# Patient Record
Sex: FEMALE | Race: WHITE | NOT HISPANIC OR LATINO | Employment: FULL TIME | URBAN - METROPOLITAN AREA
[De-identification: names, ages, dates, MRNs, and addresses within clinical notes are randomized per-mention and may not be internally consistent; named-entity substitution may affect disease eponyms.]

---

## 2018-03-01 PROBLEM — E03.9 HYPOTHYROIDISM: Status: ACTIVE | Noted: 2018-03-01

## 2018-03-01 PROBLEM — E66.9 CLASS 2 OBESITY WITH BODY MASS INDEX (BMI) OF 38.0 TO 38.9 IN ADULT: Status: ACTIVE | Noted: 2018-03-01

## 2018-03-01 PROBLEM — M54.50 LUMBAR PAIN WITH RADIATION DOWN RIGHT LEG: Status: ACTIVE | Noted: 2018-03-01

## 2018-03-01 PROBLEM — M79.604 LUMBAR PAIN WITH RADIATION DOWN RIGHT LEG: Status: ACTIVE | Noted: 2018-03-01

## 2018-03-01 PROBLEM — I10 HYPERTENSION: Status: ACTIVE | Noted: 2018-03-01

## 2018-03-01 PROBLEM — E66.812 CLASS 2 OBESITY WITH BODY MASS INDEX (BMI) OF 38.0 TO 38.9 IN ADULT: Status: ACTIVE | Noted: 2018-03-01

## 2018-11-02 PROBLEM — Z12.11 COLON CANCER SCREENING: Status: ACTIVE | Noted: 2018-11-02

## 2018-11-02 PROBLEM — Z12.39 BREAST CANCER SCREENING: Status: ACTIVE | Noted: 2018-11-02

## 2018-11-02 PROBLEM — I10 HYPERTENSION, ESSENTIAL: Status: ACTIVE | Noted: 2018-11-02

## 2018-12-18 LAB
HUMAN PAPILLOMAVIRUS (HPV): NORMAL
PAP SMEAR: NORMAL

## 2019-05-09 PROBLEM — I10 HYPERTENSION: Status: RESOLVED | Noted: 2018-03-01 | Resolved: 2019-05-09

## 2019-05-09 PROBLEM — E66.9 CLASS 2 OBESITY WITH BODY MASS INDEX (BMI) OF 38.0 TO 38.9 IN ADULT: Status: RESOLVED | Noted: 2018-03-01 | Resolved: 2019-05-09

## 2019-05-09 PROBLEM — E66.812 CLASS 2 OBESITY WITH BODY MASS INDEX (BMI) OF 38.0 TO 38.9 IN ADULT: Status: RESOLVED | Noted: 2018-03-01 | Resolved: 2019-05-09

## 2020-11-06 ENCOUNTER — PATIENT OUTREACH (OUTPATIENT)
Dept: ADMINISTRATIVE | Facility: HOSPITAL | Age: 60
End: 2020-11-06

## 2020-11-06 DIAGNOSIS — Z11.59 ENCOUNTER FOR HEPATITIS C SCREENING TEST FOR LOW RISK PATIENT: ICD-10-CM

## 2020-11-06 DIAGNOSIS — Z12.12 SCREENING FOR COLORECTAL CANCER: Primary | ICD-10-CM

## 2020-11-06 DIAGNOSIS — Z12.11 SCREENING FOR COLORECTAL CANCER: Primary | ICD-10-CM

## 2020-11-06 NOTE — PROGRESS NOTES
2020 Care Everywhere updates requested and reviewed.  Immunizations reconciled. Media reports reviewed.  Duplicate HM overrides and  orders removed.  Overdue HM topic chart audit and/or requested.  Overdue lab testing linked to upcoming lab appointments if applies.    Lab jose, and GloNav reviewed   Pap Smear and Lab testing   YES      WOG orders placed. HEP C, FITKIT  Letter mailed.  YES  HM updated with external PAP, HPV report.       Health Maintenance Due   Topic Date Due    Hepatitis C Screening  1960    HIV Screening  10/20/1977    Colorectal Cancer Screening  10/20/2012

## 2020-11-06 NOTE — LETTER
November 6, 2020    Shira Gloria  [address] [facility] [address]             [facility]  [address]  [facility] [address]  Phone: [phone] [facility] is committed to your overall health.  To help you get the most out of each of your visits, we will review your information to make sure you are up to date on all of your recommended tests and/or procedures.      Dr. Lyle Warren III, MD has found that your chart shows you may be due for a:    COLORECTAL CANCER SCREENING       If you have had any of the above done at another facility, please bring the records or information with you so that your record at [facility] will be complete.  If you would like to schedule any of these, please contact the clinic at [phone].    If you are currently taking medication, please bring it with you to your appointment for review.    Also, if you have any type of Advanced Directives, please bring them with you to your office visit so we may scan them into your chart.    Thank You,    Your [facility],  MD Albertina Hartley III, LPN Clinical Care Coordinator  [facility]  [address] [place] [address]  Phone [phone]  Fax: [phone]

## 2020-11-07 ENCOUNTER — PATIENT OUTREACH (OUTPATIENT)
Dept: ADMINISTRATIVE | Facility: HOSPITAL | Age: 60
End: 2020-11-07

## 2020-11-08 NOTE — PROGRESS NOTES
Immunizations reviewed. Legacy reviewed. Care Everywhere reviewed.  [facility], [facility], and [facility] reviewed.   PAP from 2018 hyperlinked to HM per [facility].   HM modifier adjusted. HM updated. Gap closed.   Chart review completed.               JASON

## 2020-11-09 ENCOUNTER — TELEPHONE (OUTPATIENT)
Dept: FAMILY MEDICINE | Facility: CLINIC | Age: 60
End: 2020-11-09

## 2020-11-09 NOTE — TELEPHONE ENCOUNTER
----- Message from Carrington Matamoros sent at 11/9/2020  8:47 AM CST -----  Contact: pt at  [phone]  Type:  RX Refill Request  Who Called:  pt  Refill or New Rx:  refill  RX Name and Strength:  diltiaZEM (CARDIZEM CD) 240 MG 24 hr capsule,levothyroxine (SYNTHROID) 88 MCG tablet  How is the patient currently taking it? (ex. 1XDay):  1XDay  Is this a 30 day or 90 day RX:  90  Preferred Pharmacy with phone number:    [facility][id number] - [place] [address] AT [facility] [address] & [facility]  [address] [place] [address]  Phone: [phone] Fax: [phone]  Local or Mail Order:  Local  Ordering Provider:  Kelvin Lerma Call Back Number:  [phone]  Additional Information:  pt states she called in medication refills last Thursday and she has a week supply left before her appointment. Please call back and advise

## 2020-11-09 NOTE — TELEPHONE ENCOUNTER
----- Message from Carrington Matamoros sent at 11/9/2020  8:47 AM CST -----  Contact: pt at  [phone]  Type:  RX Refill Request  Who Called:  pt  Refill or New Rx:  refill  RX Name and Strength:  diltiaZEM (CARDIZEM CD) 240 MG 24 hr capsule,levothyroxine (SYNTHROID) 88 MCG tablet  How is the patient currently taking it? (ex. 1XDay):  1XDay  Is this a 30 day or 90 day RX:  90  Preferred Pharmacy with phone number:    [facility] #[id number] - [place] [address] AT [facility] [address] & [email or web address]  [address] [place] [address]  Phone: [phone] Fax: [phone]  Local or Mail Order:  Local  Ordering Provider:  Kelvin Lerma Call Back Number:  [phone]  Additional Information:  pt states she called in medication refills last Thursday and she has a week supply left before her appointment. Please call back and advise      CALLED IN BOTH RX 90/1 QD

## 2020-11-17 ENCOUNTER — TELEPHONE (OUTPATIENT)
Dept: FAMILY MEDICINE | Facility: CLINIC | Age: 60
End: 2020-11-17

## 2020-11-17 NOTE — TELEPHONE ENCOUNTER
----- Message from Hugo Osuna sent at 11/17/2020 10:13 AM CST -----  Contact: Self  Type: Needs Medical Advice  Who Called:  Patient  Symptoms (please be specific):  Poison ivy  How long has patient had these symptoms:  yesterday  Pharmacy name and phone #:    [facility] #[id number] - [address] AT [facility] [address] & [place] [place] [address]  Phone: [phone] Fax: [phone]      Best Call Back Number: [phone] (home)     Additional Information: Patient would like to be re-prescribed medication used to treat symptoms earlier this year.       Diprolene cream #60gr/2 apply bid rash

## 2020-11-20 ENCOUNTER — HOSPITAL ENCOUNTER (OUTPATIENT)
Dept: RADIOLOGY | Facility: HOSPITAL | Age: 60
Discharge: HOME | End: 2020-11-20
Attending: FAMILY MEDICINE
Payer: COMMERCIAL

## 2020-11-20 ENCOUNTER — OFFICE VISIT (OUTPATIENT)
Dept: FAMILY MEDICINE | Facility: CLINIC | Age: 60
End: 2020-11-20
Payer: COMMERCIAL

## 2020-11-20 VITALS
TEMPERATURE: 97 F | OXYGEN SATURATION: 95 % | SYSTOLIC BLOOD PRESSURE: 114 MMHG | DIASTOLIC BLOOD PRESSURE: 65 MMHG | HEART RATE: 66 BPM | BODY MASS INDEX: 39.65 KG/M2 | WEIGHT: 203 LBS

## 2020-11-20 DIAGNOSIS — H91.90 HEARING LOSS, UNSPECIFIED HEARING LOSS TYPE, UNSPECIFIED LATERALITY: ICD-10-CM

## 2020-11-20 DIAGNOSIS — M54.31 RIGHT SCIATIC NERVE PAIN: ICD-10-CM

## 2020-11-20 DIAGNOSIS — I10 HYPERTENSION, ESSENTIAL: Primary | ICD-10-CM

## 2020-11-20 DIAGNOSIS — E66.01 CLASS 2 SEVERE OBESITY DUE TO EXCESS CALORIES WITH SERIOUS COMORBIDITY AND BODY MASS INDEX (BMI) OF 39.0 TO 39.9 IN ADULT: ICD-10-CM

## 2020-11-20 DIAGNOSIS — E03.9 HYPOTHYROIDISM, UNSPECIFIED TYPE: ICD-10-CM

## 2020-11-20 DIAGNOSIS — M54.31 RIGHT SIDED SCIATICA: ICD-10-CM

## 2020-11-20 DIAGNOSIS — L25.9 CONTACT DERMATITIS, UNSPECIFIED CONTACT DERMATITIS TYPE, UNSPECIFIED TRIGGER: ICD-10-CM

## 2020-11-20 PROCEDURE — 3008F PR BODY MASS INDEX (BMI) DOCUMENTED: ICD-10-PCS | Mod: CPTII,S$GLB,, | Performed by: FAMILY MEDICINE

## 2020-11-20 PROCEDURE — 3008F BODY MASS INDEX DOCD: CPT | Mod: CPTII,S$GLB,, | Performed by: FAMILY MEDICINE

## 2020-11-20 PROCEDURE — 99214 PR OFFICE/OUTPT VISIT, EST, LEVL IV, 30-39 MIN: ICD-10-PCS | Mod: S$GLB,,, | Performed by: FAMILY MEDICINE

## 2020-11-20 PROCEDURE — 1125F PR PAIN SEVERITY QUANTIFIED, PAIN PRESENT: ICD-10-PCS | Mod: S$GLB,,, | Performed by: FAMILY MEDICINE

## 2020-11-20 PROCEDURE — 1125F AMNT PAIN NOTED PAIN PRSNT: CPT | Mod: S$GLB,,, | Performed by: FAMILY MEDICINE

## 2020-11-20 PROCEDURE — 3078F PR MOST RECENT DIASTOLIC BLOOD PRESSURE < 80 MM HG: ICD-10-PCS | Mod: CPTII,S$GLB,, | Performed by: FAMILY MEDICINE

## 2020-11-20 PROCEDURE — 3074F SYST BP LT 130 MM HG: CPT | Mod: CPTII,S$GLB,, | Performed by: FAMILY MEDICINE

## 2020-11-20 PROCEDURE — 72110 X-RAY EXAM L-2 SPINE 4/>VWS: CPT | Mod: TC

## 2020-11-20 PROCEDURE — 99214 OFFICE O/P EST MOD 30 MIN: CPT | Mod: S$GLB,,, | Performed by: FAMILY MEDICINE

## 2020-11-20 PROCEDURE — 3074F PR MOST RECENT SYSTOLIC BLOOD PRESSURE < 130 MM HG: ICD-10-PCS | Mod: CPTII,S$GLB,, | Performed by: FAMILY MEDICINE

## 2020-11-20 PROCEDURE — 3078F DIAST BP <80 MM HG: CPT | Mod: CPTII,S$GLB,, | Performed by: FAMILY MEDICINE

## 2020-11-20 RX ORDER — HYDROCODONE BITARTRATE AND ACETAMINOPHEN 5; 325 MG/1; MG/1
1 TABLET ORAL EVERY 6 HOURS PRN
Qty: 28 TABLET | Refills: 0 | Status: SHIPPED | OUTPATIENT
Start: 2020-11-20 | End: 2021-05-07

## 2020-11-20 RX ORDER — INFLUENZA A VIRUS A/VICTORIA/2454/2019 IVR-207 (H1N1) ANTIGEN (PROPIOLACTONE INACTIVATED), INFLUENZA A VIRUS A/HONG KONG/2671/2019 IVR-208 (H3N2) ANTIGEN (PROPIOLACTONE INACTIVATED), INFLUENZA B VIRUS B/VICTORIA/705/2018 BVR-11 ANTIGEN (PROPIOLACTONE INACTIVATED), INFLUENZA B VIRUS B/PHUKET/3073/2013 BVR-1B ANTIGEN (PROPIOLACTONE INACTIVATED) 15; 15; 15; 15 UG/.5ML; UG/.5ML; UG/.5ML; UG/.5ML
INJECTION, SUSPENSION INTRAMUSCULAR
COMMUNITY
Start: 2020-08-25 | End: 2021-05-07

## 2020-11-20 RX ORDER — HYDROCODONE BITARTRATE AND ACETAMINOPHEN 5; 325 MG/1; MG/1
1 TABLET ORAL EVERY 6 HOURS PRN
COMMUNITY
End: 2020-11-20 | Stop reason: SDUPTHER

## 2020-11-20 RX ORDER — PREDNISONE 20 MG/1
TABLET ORAL
Qty: 14 TABLET | Refills: 0 | Status: SHIPPED | OUTPATIENT
Start: 2020-11-20 | End: 2021-05-07

## 2020-11-20 RX ORDER — PREDNISONE 20 MG/1
20 TABLET ORAL DAILY
COMMUNITY
End: 2020-11-20 | Stop reason: SDUPTHER

## 2020-11-20 NOTE — PROGRESS NOTES
Subjective:       Patient ID: Shira Gloria is a 60 y.o. female.    Chief Complaint: Follow-up (6m) and Back Pain    Here today for back pain. She states she was sitting down when she turned around, and the back pain came on suddenly. Pain radiates down the right leg. She is unable to sit comfortably. Also reports poison ivy rash on the left calf. She had a poison ivy rash in May as well. She additionally notes hearing loss. She works as an . Overall she is doing well. Her blood pressure has been doing ok. Up to date on her mammogram, she had it this morning. Labs are due. She already had her flu shot. Colonoscopy was scheduled, but it was cancelled due to covid. She would like to go to a GI in [place] or do a Cologuard instead.   Hypertension under good control.  No chest pain palpitations PND orthopnea pedal edema.  Pulmonary no cough or sputum no dyspnea.  Hypothyroidism.  Weight is about the same.  Discussed efficacy of Cologuard versus colonoscopy.  Review of Systems   Constitutional: Negative.  Negative for appetite change, chills, fatigue and fever.   HENT: Negative.  Negative for ear pain, rhinorrhea, sinus pressure/congestion and sore throat.         Hearing loss   Respiratory: Negative.  Negative for cough, chest tightness, shortness of breath and wheezing.    Cardiovascular: Negative.  Negative for chest pain, palpitations and leg swelling.   Gastrointestinal: Negative.  Negative for abdominal pain, diarrhea, nausea and vomiting.   Genitourinary: Negative.  Negative for dysuria, frequency, hematuria and urgency.   Musculoskeletal: Positive for back pain.   Integumentary:  Positive for rash.   Neurological: Negative.  Negative for dizziness, weakness, numbness and headaches.   All other systems reviewed and are negative.        Objective:      Physical Exam  Vitals signs reviewed.   Constitutional:       General: She is not in acute distress.     Appearance: Normal appearance.   HENT:       Head: Normocephalic and atraumatic.      Right Ear: Tympanic membrane, ear canal and external ear normal.      Left Ear: Tympanic membrane, ear canal and external ear normal.      Nose: Nose normal.      Mouth/Throat:      Mouth: Mucous membranes are moist.   Eyes:      Pupils: Pupils are equal, round, and reactive to light.   Neck:      Musculoskeletal: Normal range of motion and neck supple.      Vascular: No carotid bruit.   Cardiovascular:      Rate and Rhythm: Normal rate and regular rhythm.      Pulses: Normal pulses.      Heart sounds: Normal heart sounds. No murmur. No friction rub. No gallop.    Pulmonary:      Effort: Pulmonary effort is normal.      Breath sounds: Normal breath sounds. No wheezing, rhonchi or rales.   Abdominal:      General: Abdomen is flat.      Palpations: Abdomen is soft.   Musculoskeletal:      Right lower leg: No edema.      Left lower leg: No edema.      Comments: Right straight leg raise positive, left straight leg raise negative  Pain right SI joint   Lymphadenopathy:      Cervical: No cervical adenopathy.   Skin:     General: Skin is warm and dry.      Capillary Refill: Capillary refill takes less than 2 seconds.      Comments: Left calf - patch of erythema and vesicles    Neurological:      General: No focal deficit present.      Mental Status: She is alert and oriented to person, place, and time.   Psychiatric:         Mood and Affect: Mood normal.         Behavior: Behavior normal.         Assessment:       1. Hypertension, essential    2. Right sided sciatica    3. Contact dermatitis, unspecified contact dermatitis type, unspecified trigger    4. Hypothyroidism, unspecified type    5. Right sciatic nerve pain    6. Hearing loss, unspecified hearing loss type, unspecified laterality    7. Class 2 severe obesity due to excess calories with serious comorbidity and body mass index (BMI) of 39.0 to 39.9 in adult        Plan:       *Discussed colonoscopy vs cologuard.  Given   Rakesh and Dr. Don does name if she decides to the colonoscopy.  Mammogram today as planned.    Mammogram result. Xray lumbar spine to be sure there is no fracture.  Prednisone 20 mg 2 qd for 7 days #14.  This should help the back and the contact dermatitis both.  Hydrocodone APAP 5/325 mg q 6 hours prn #28 a diet and weight reduction.  Same thyroid and antihypertensive medications.  TSH..**

## 2020-11-22 PROBLEM — H91.90 HEARING LOSS: Status: ACTIVE | Noted: 2020-11-22

## 2020-11-22 PROBLEM — M54.31 RIGHT SCIATIC NERVE PAIN: Status: ACTIVE | Noted: 2020-11-22

## 2020-11-22 PROBLEM — E66.01 CLASS 2 SEVERE OBESITY DUE TO EXCESS CALORIES WITH SERIOUS COMORBIDITY AND BODY MASS INDEX (BMI) OF 39.0 TO 39.9 IN ADULT: Status: ACTIVE | Noted: 2018-03-01

## 2020-12-01 LAB — NONINV COLON CA DNA+OCC BLD SCRN STL QL: NEGATIVE

## 2021-05-07 ENCOUNTER — OFFICE VISIT (OUTPATIENT)
Dept: FAMILY MEDICINE | Facility: CLINIC | Age: 61
End: 2021-05-07
Payer: COMMERCIAL

## 2021-05-07 VITALS
WEIGHT: 206 LBS | HEIGHT: 61 IN | DIASTOLIC BLOOD PRESSURE: 69 MMHG | TEMPERATURE: 98 F | OXYGEN SATURATION: 96 % | BODY MASS INDEX: 38.89 KG/M2 | HEART RATE: 74 BPM | SYSTOLIC BLOOD PRESSURE: 116 MMHG

## 2021-05-07 DIAGNOSIS — M54.31 RIGHT SCIATIC NERVE PAIN: ICD-10-CM

## 2021-05-07 DIAGNOSIS — Z13.220 SCREENING CHOLESTEROL LEVEL: ICD-10-CM

## 2021-05-07 DIAGNOSIS — E03.9 HYPOTHYROIDISM, UNSPECIFIED TYPE: ICD-10-CM

## 2021-05-07 DIAGNOSIS — Z11.4 SCREENING FOR HIV (HUMAN IMMUNODEFICIENCY VIRUS): ICD-10-CM

## 2021-05-07 DIAGNOSIS — G47.33 OSA ON CPAP: ICD-10-CM

## 2021-05-07 DIAGNOSIS — I10 HYPERTENSION, ESSENTIAL: Primary | ICD-10-CM

## 2021-05-07 PROCEDURE — 3008F BODY MASS INDEX DOCD: CPT | Mod: CPTII,S$GLB,, | Performed by: FAMILY MEDICINE

## 2021-05-07 PROCEDURE — 99214 OFFICE O/P EST MOD 30 MIN: CPT | Mod: S$GLB,,, | Performed by: FAMILY MEDICINE

## 2021-05-07 PROCEDURE — 99214 PR OFFICE/OUTPT VISIT, EST, LEVL IV, 30-39 MIN: ICD-10-PCS | Mod: S$GLB,,, | Performed by: FAMILY MEDICINE

## 2021-05-07 PROCEDURE — 3008F PR BODY MASS INDEX (BMI) DOCUMENTED: ICD-10-PCS | Mod: CPTII,S$GLB,, | Performed by: FAMILY MEDICINE

## 2021-05-07 PROCEDURE — 3074F SYST BP LT 130 MM HG: CPT | Mod: CPTII,S$GLB,, | Performed by: FAMILY MEDICINE

## 2021-05-07 PROCEDURE — 1126F AMNT PAIN NOTED NONE PRSNT: CPT | Mod: S$GLB,,, | Performed by: FAMILY MEDICINE

## 2021-05-07 PROCEDURE — 3078F PR MOST RECENT DIASTOLIC BLOOD PRESSURE < 80 MM HG: ICD-10-PCS | Mod: CPTII,S$GLB,, | Performed by: FAMILY MEDICINE

## 2021-05-07 PROCEDURE — 3074F PR MOST RECENT SYSTOLIC BLOOD PRESSURE < 130 MM HG: ICD-10-PCS | Mod: CPTII,S$GLB,, | Performed by: FAMILY MEDICINE

## 2021-05-07 PROCEDURE — 1126F PR PAIN SEVERITY QUANTIFIED, NO PAIN PRESENT: ICD-10-PCS | Mod: S$GLB,,, | Performed by: FAMILY MEDICINE

## 2021-05-07 PROCEDURE — 3078F DIAST BP <80 MM HG: CPT | Mod: CPTII,S$GLB,, | Performed by: FAMILY MEDICINE

## 2021-05-07 RX ORDER — DILTIAZEM HYDROCHLORIDE 240 MG/1
240 CAPSULE, COATED, EXTENDED RELEASE ORAL DAILY
Qty: 90 CAPSULE | Refills: 1 | Status: SHIPPED | OUTPATIENT
Start: 2021-05-07 | End: 2021-10-22 | Stop reason: SDUPTHER

## 2021-05-07 RX ORDER — LEVOTHYROXINE SODIUM 88 UG/1
88 TABLET ORAL DAILY
Qty: 90 TABLET | Refills: 1 | Status: SHIPPED | OUTPATIENT
Start: 2021-05-07 | End: 2021-10-22 | Stop reason: SDUPTHER

## 2021-05-08 PROBLEM — G47.33 OSA ON CPAP: Status: ACTIVE | Noted: 2021-05-08

## 2021-05-10 ENCOUNTER — PATIENT OUTREACH (OUTPATIENT)
Dept: ADMINISTRATIVE | Facility: HOSPITAL | Age: 61
End: 2021-05-10

## 2021-05-11 ENCOUNTER — TELEPHONE (OUTPATIENT)
Dept: FAMILY MEDICINE | Facility: CLINIC | Age: 61
End: 2021-05-11

## 2021-05-11 LAB
ALBUMIN SERPL-MCNC: 4 G/DL (ref 3.6–5.1)
ALBUMIN/GLOB SERPL: 1.4 (CALC) (ref 1–2.5)
ALP SERPL-CCNC: 144 U/L (ref 37–153)
ALT SERPL-CCNC: 30 U/L (ref 6–29)
AST SERPL-CCNC: 29 U/L (ref 10–35)
BASOPHILS # BLD AUTO: 87 CELLS/UL (ref 0–200)
BASOPHILS NFR BLD AUTO: 1.5 %
BILIRUB SERPL-MCNC: 0.8 MG/DL (ref 0.2–1.2)
BUN SERPL-MCNC: 10 MG/DL (ref 7–25)
BUN/CREAT SERPL: ABNORMAL (CALC) (ref 6–22)
CALCIUM SERPL-MCNC: 9.2 MG/DL (ref 8.6–10.4)
CHLORIDE SERPL-SCNC: 106 MMOL/L (ref 98–110)
CHOLEST SERPL-MCNC: 225 MG/DL
CHOLEST/HDLC SERPL: 4.3 (CALC)
CO2 SERPL-SCNC: 28 MMOL/L (ref 20–32)
CREAT SERPL-MCNC: 0.74 MG/DL (ref 0.5–1.05)
EOSINOPHIL # BLD AUTO: 389 CELLS/UL (ref 15–500)
EOSINOPHIL NFR BLD AUTO: 6.7 %
ERYTHROCYTE [DISTWIDTH] IN BLOOD BY AUTOMATED COUNT: 13.3 % (ref 11–15)
GLOBULIN SER CALC-MCNC: 2.8 G/DL (CALC) (ref 1.9–3.7)
GLUCOSE SERPL-MCNC: 92 MG/DL (ref 65–99)
HCT VFR BLD AUTO: 45.3 % (ref 35–45)
HDLC SERPL-MCNC: 52 MG/DL
HGB BLD-MCNC: 14.9 G/DL (ref 11.7–15.5)
HIV 1+2 AB+HIV1 P24 AG SERPL QL IA: NORMAL
LDLC SERPL CALC-MCNC: 146 MG/DL (CALC)
LYMPHOCYTES # BLD AUTO: 1914 CELLS/UL (ref 850–3900)
LYMPHOCYTES NFR BLD AUTO: 33 %
MCH RBC QN AUTO: 28.9 PG (ref 27–33)
MCHC RBC AUTO-ENTMCNC: 32.9 G/DL (ref 32–36)
MCV RBC AUTO: 88 FL (ref 80–100)
MONOCYTES # BLD AUTO: 621 CELLS/UL (ref 200–950)
MONOCYTES NFR BLD AUTO: 10.7 %
NEUTROPHILS # BLD AUTO: 2790 CELLS/UL (ref 1500–7800)
NEUTROPHILS NFR BLD AUTO: 48.1 %
NONHDLC SERPL-MCNC: 173 MG/DL (CALC)
PLATELET # BLD AUTO: 277 THOUSAND/UL (ref 140–400)
PMV BLD REES-ECKER: 10.4 FL (ref 7.5–12.5)
POTASSIUM SERPL-SCNC: 4 MMOL/L (ref 3.5–5.3)
PROT SERPL-MCNC: 6.8 G/DL (ref 6.1–8.1)
RBC # BLD AUTO: 5.15 MILLION/UL (ref 3.8–5.1)
SODIUM SERPL-SCNC: 140 MMOL/L (ref 135–146)
T4 FREE SERPL-MCNC: 1.2 NG/DL (ref 0.8–1.8)
TRIGL SERPL-MCNC: 144 MG/DL
TSH SERPL-ACNC: 0.57 MIU/L (ref 0.4–4.5)
WBC # BLD AUTO: 5.8 THOUSAND/UL (ref 3.8–10.8)

## 2021-10-22 ENCOUNTER — OFFICE VISIT (OUTPATIENT)
Dept: FAMILY MEDICINE | Facility: CLINIC | Age: 61
End: 2021-10-22
Payer: COMMERCIAL

## 2021-10-22 VITALS
BODY MASS INDEX: 39.38 KG/M2 | SYSTOLIC BLOOD PRESSURE: 126 MMHG | HEIGHT: 61 IN | OXYGEN SATURATION: 95 % | DIASTOLIC BLOOD PRESSURE: 72 MMHG | TEMPERATURE: 98 F | HEART RATE: 62 BPM | RESPIRATION RATE: 20 BRPM | WEIGHT: 208.56 LBS

## 2021-10-22 DIAGNOSIS — Z00.00 ANNUAL PHYSICAL EXAM: Primary | ICD-10-CM

## 2021-10-22 DIAGNOSIS — Z11.59 NEED FOR HEPATITIS C SCREENING TEST: ICD-10-CM

## 2021-10-22 DIAGNOSIS — E03.4 HYPOTHYROIDISM DUE TO ACQUIRED ATROPHY OF THYROID: ICD-10-CM

## 2021-10-22 DIAGNOSIS — Z00.00 LABORATORY EXAM ORDERED AS PART OF ROUTINE GENERAL MEDICAL EXAMINATION: ICD-10-CM

## 2021-10-22 DIAGNOSIS — I10 HYPERTENSION, ESSENTIAL: ICD-10-CM

## 2021-10-22 DIAGNOSIS — R82.998 DARK URINE: ICD-10-CM

## 2021-10-22 DIAGNOSIS — G47.33 OSA ON CPAP: ICD-10-CM

## 2021-10-22 DIAGNOSIS — H91.90 HEARING LOSS, UNSPECIFIED HEARING LOSS TYPE, UNSPECIFIED LATERALITY: ICD-10-CM

## 2021-10-22 LAB
ALBUMIN SERPL BCP-MCNC: 3.7 G/DL (ref 3.5–5.2)
ALP SERPL-CCNC: 157 U/L (ref 55–135)
ALT SERPL W/O P-5'-P-CCNC: 17 U/L (ref 10–44)
ANION GAP SERPL CALC-SCNC: 13 MMOL/L (ref 8–16)
AST SERPL-CCNC: 25 U/L (ref 10–40)
BASOPHILS # BLD AUTO: 0.09 K/UL (ref 0–0.2)
BASOPHILS NFR BLD: 1.5 % (ref 0–1.9)
BILIRUB SERPL-MCNC: 0.7 MG/DL (ref 0.1–1)
BILIRUB SERPL-MCNC: NEGATIVE MG/DL
BLOOD URINE, POC: NEGATIVE
BUN SERPL-MCNC: 10 MG/DL (ref 6–20)
CALCIUM SERPL-MCNC: 9.8 MG/DL (ref 8.7–10.5)
CHLORIDE SERPL-SCNC: 104 MMOL/L (ref 95–110)
CHOLEST SERPL-MCNC: 195 MG/DL (ref 120–199)
CHOLEST/HDLC SERPL: 4.1 {RATIO} (ref 2–5)
CLARITY, POC UA: CLEAR
CO2 SERPL-SCNC: 22 MMOL/L (ref 23–29)
COLOR, POC UA: YELLOW
CREAT SERPL-MCNC: 0.8 MG/DL (ref 0.5–1.4)
DIFFERENTIAL METHOD: NORMAL
EOSINOPHIL # BLD AUTO: 0.4 K/UL (ref 0–0.5)
EOSINOPHIL NFR BLD: 5.6 % (ref 0–8)
ERYTHROCYTE [DISTWIDTH] IN BLOOD BY AUTOMATED COUNT: 13.2 % (ref 11.5–14.5)
EST. GFR  (AFRICAN AMERICAN): >60 ML/MIN/1.73 M^2
EST. GFR  (NON AFRICAN AMERICAN): >60 ML/MIN/1.73 M^2
GLUCOSE SERPL-MCNC: 91 MG/DL (ref 70–110)
GLUCOSE UR QL STRIP: NORMAL
HCT VFR BLD AUTO: 45.4 % (ref 37–48.5)
HDLC SERPL-MCNC: 48 MG/DL (ref 40–75)
HDLC SERPL: 24.6 % (ref 20–50)
HGB BLD-MCNC: 15.5 G/DL (ref 12–16)
IMM GRANULOCYTES # BLD AUTO: 0.03 K/UL (ref 0–0.04)
IMM GRANULOCYTES NFR BLD AUTO: 0.5 % (ref 0–0.5)
KETONES UR QL STRIP: NEGATIVE
LDLC SERPL CALC-MCNC: 118.8 MG/DL (ref 63–159)
LEUKOCYTE ESTERASE URINE, POC: NEGATIVE
LYMPHOCYTES # BLD AUTO: 2 K/UL (ref 1–4.8)
LYMPHOCYTES NFR BLD: 31.5 % (ref 18–48)
MCH RBC QN AUTO: 30.5 PG (ref 27–31)
MCHC RBC AUTO-ENTMCNC: 34.1 G/DL (ref 32–36)
MCV RBC AUTO: 89 FL (ref 82–98)
MONOCYTES # BLD AUTO: 0.5 K/UL (ref 0.3–1)
MONOCYTES NFR BLD: 7.4 % (ref 4–15)
NEUTROPHILS # BLD AUTO: 3.3 K/UL (ref 1.8–7.7)
NEUTROPHILS NFR BLD: 53.5 % (ref 38–73)
NITRITE, POC UA: NEGATIVE
NONHDLC SERPL-MCNC: 147 MG/DL
NRBC BLD-RTO: 0 /100 WBC
PH, POC UA: 7
PLATELET # BLD AUTO: 241 K/UL (ref 150–450)
PMV BLD AUTO: 10.6 FL (ref 9.2–12.9)
POTASSIUM SERPL-SCNC: 3.8 MMOL/L (ref 3.5–5.1)
PROT SERPL-MCNC: 7.1 G/DL (ref 6–8.4)
PROTEIN, POC: ABNORMAL
RBC # BLD AUTO: 5.08 M/UL (ref 4–5.4)
SODIUM SERPL-SCNC: 139 MMOL/L (ref 136–145)
SPECIFIC GRAVITY, POC UA: 1.01
TRIGL SERPL-MCNC: 141 MG/DL (ref 30–150)
TSH SERPL DL<=0.005 MIU/L-ACNC: 0.95 UIU/ML (ref 0.4–4)
UROBILINOGEN, POC UA: NORMAL
WBC # BLD AUTO: 6.2 K/UL (ref 3.9–12.7)

## 2021-10-22 PROCEDURE — 99396 PREV VISIT EST AGE 40-64: CPT | Mod: 25,S$GLB,, | Performed by: NURSE PRACTITIONER

## 2021-10-22 PROCEDURE — 3074F SYST BP LT 130 MM HG: CPT | Mod: CPTII,S$GLB,, | Performed by: NURSE PRACTITIONER

## 2021-10-22 PROCEDURE — 85025 COMPLETE CBC W/AUTO DIFF WBC: CPT | Performed by: NURSE PRACTITIONER

## 2021-10-22 PROCEDURE — 81002 POCT URINE DIPSTICK WITHOUT MICROSCOPE: ICD-10-PCS | Mod: S$GLB,,, | Performed by: NURSE PRACTITIONER

## 2021-10-22 PROCEDURE — 3078F PR MOST RECENT DIASTOLIC BLOOD PRESSURE < 80 MM HG: ICD-10-PCS | Mod: CPTII,S$GLB,, | Performed by: NURSE PRACTITIONER

## 2021-10-22 PROCEDURE — 1160F RVW MEDS BY RX/DR IN RCRD: CPT | Mod: CPTII,S$GLB,, | Performed by: NURSE PRACTITIONER

## 2021-10-22 PROCEDURE — 3008F PR BODY MASS INDEX (BMI) DOCUMENTED: ICD-10-PCS | Mod: CPTII,S$GLB,, | Performed by: NURSE PRACTITIONER

## 2021-10-22 PROCEDURE — 1160F PR REVIEW ALL MEDS BY PRESCRIBER/CLIN PHARMACIST DOCUMENTED: ICD-10-PCS | Mod: CPTII,S$GLB,, | Performed by: NURSE PRACTITIONER

## 2021-10-22 PROCEDURE — 36415 PR COLLECTION VENOUS BLOOD,VENIPUNCTURE: ICD-10-PCS | Mod: S$GLB,,, | Performed by: NURSE PRACTITIONER

## 2021-10-22 PROCEDURE — 84443 ASSAY THYROID STIM HORMONE: CPT | Performed by: NURSE PRACTITIONER

## 2021-10-22 PROCEDURE — 3078F DIAST BP <80 MM HG: CPT | Mod: CPTII,S$GLB,, | Performed by: NURSE PRACTITIONER

## 2021-10-22 PROCEDURE — 99396 PR PREVENTIVE VISIT,EST,40-64: ICD-10-PCS | Mod: 25,S$GLB,, | Performed by: NURSE PRACTITIONER

## 2021-10-22 PROCEDURE — 86803 HEPATITIS C AB TEST: CPT | Performed by: NURSE PRACTITIONER

## 2021-10-22 PROCEDURE — 3008F BODY MASS INDEX DOCD: CPT | Mod: CPTII,S$GLB,, | Performed by: NURSE PRACTITIONER

## 2021-10-22 PROCEDURE — 80053 COMPREHEN METABOLIC PANEL: CPT | Performed by: NURSE PRACTITIONER

## 2021-10-22 PROCEDURE — 1159F PR MEDICATION LIST DOCUMENTED IN MEDICAL RECORD: ICD-10-PCS | Mod: CPTII,S$GLB,, | Performed by: NURSE PRACTITIONER

## 2021-10-22 PROCEDURE — 36415 COLL VENOUS BLD VENIPUNCTURE: CPT | Mod: S$GLB,,, | Performed by: NURSE PRACTITIONER

## 2021-10-22 PROCEDURE — 1159F MED LIST DOCD IN RCRD: CPT | Mod: CPTII,S$GLB,, | Performed by: NURSE PRACTITIONER

## 2021-10-22 PROCEDURE — 81002 URINALYSIS NONAUTO W/O SCOPE: CPT | Mod: S$GLB,,, | Performed by: NURSE PRACTITIONER

## 2021-10-22 PROCEDURE — 3074F PR MOST RECENT SYSTOLIC BLOOD PRESSURE < 130 MM HG: ICD-10-PCS | Mod: CPTII,S$GLB,, | Performed by: NURSE PRACTITIONER

## 2021-10-22 PROCEDURE — 80061 LIPID PANEL: CPT | Performed by: NURSE PRACTITIONER

## 2021-10-22 RX ORDER — LEVOTHYROXINE SODIUM 88 UG/1
88 TABLET ORAL DAILY
Qty: 90 TABLET | Refills: 1 | Status: SHIPPED | OUTPATIENT
Start: 2021-10-22 | End: 2022-04-22 | Stop reason: SDUPTHER

## 2021-10-22 RX ORDER — DILTIAZEM HYDROCHLORIDE 240 MG/1
240 CAPSULE, COATED, EXTENDED RELEASE ORAL DAILY
Qty: 90 CAPSULE | Refills: 1 | Status: SHIPPED | OUTPATIENT
Start: 2021-10-22 | End: 2022-04-22 | Stop reason: SDUPTHER

## 2021-10-25 LAB — HCV AB SERPL QL IA: NEGATIVE

## 2021-10-26 ENCOUNTER — PATIENT MESSAGE (OUTPATIENT)
Dept: FAMILY MEDICINE | Facility: CLINIC | Age: 61
End: 2021-10-26
Payer: COMMERCIAL

## 2022-01-10 ENCOUNTER — TELEPHONE (OUTPATIENT)
Dept: FAMILY MEDICINE | Facility: CLINIC | Age: 62
End: 2022-01-10
Payer: COMMERCIAL

## 2022-01-10 NOTE — TELEPHONE ENCOUNTER
----- Message from Estelle Drew sent at 1/10/2022 12:26 PM CST -----  Type:  Sooner Apoointment Request    Caller is requesting a sooner appointment.  Caller declined first available appointment listed below.  Caller will not accept being placed on the waitlist and is requesting a message be sent to doctor.    Name of Caller:  Pt  When is the first available appointment?  1/25/22  Symptoms:  kidney area pain, change in color in urine, swelling in feet  Best Call Back Number:  [phone]  Additional Information:  Please call and schedule she needs to try to be seen 1/13/22

## 2022-01-14 ENCOUNTER — OFFICE VISIT (OUTPATIENT)
Dept: FAMILY MEDICINE | Facility: CLINIC | Age: 62
End: 2022-01-14
Payer: COMMERCIAL

## 2022-01-14 VITALS
HEART RATE: 72 BPM | BODY MASS INDEX: 40.73 KG/M2 | RESPIRATION RATE: 20 BRPM | OXYGEN SATURATION: 98 % | HEIGHT: 61 IN | SYSTOLIC BLOOD PRESSURE: 128 MMHG | WEIGHT: 215.75 LBS | TEMPERATURE: 99 F | DIASTOLIC BLOOD PRESSURE: 74 MMHG

## 2022-01-14 DIAGNOSIS — G47.33 OSA ON CPAP: ICD-10-CM

## 2022-01-14 DIAGNOSIS — R82.90 ABNORMAL URINE ODOR: Primary | ICD-10-CM

## 2022-01-14 DIAGNOSIS — M46.1 SACROILIITIS, NOT ELSEWHERE CLASSIFIED: ICD-10-CM

## 2022-01-14 DIAGNOSIS — E66.01 MORBID OBESITY WITH BMI OF 40.0-44.9, ADULT: ICD-10-CM

## 2022-01-14 DIAGNOSIS — R10.9 LEFT FLANK PAIN: ICD-10-CM

## 2022-01-14 DIAGNOSIS — I10 PRIMARY HYPERTENSION: ICD-10-CM

## 2022-01-14 PROCEDURE — 1160F PR REVIEW ALL MEDS BY PRESCRIBER/CLIN PHARMACIST DOCUMENTED: ICD-10-PCS | Mod: CPTII,S$GLB,, | Performed by: NURSE PRACTITIONER

## 2022-01-14 PROCEDURE — 3074F SYST BP LT 130 MM HG: CPT | Mod: CPTII,S$GLB,, | Performed by: NURSE PRACTITIONER

## 2022-01-14 PROCEDURE — 1160F RVW MEDS BY RX/DR IN RCRD: CPT | Mod: CPTII,S$GLB,, | Performed by: NURSE PRACTITIONER

## 2022-01-14 PROCEDURE — 3078F PR MOST RECENT DIASTOLIC BLOOD PRESSURE < 80 MM HG: ICD-10-PCS | Mod: CPTII,S$GLB,, | Performed by: NURSE PRACTITIONER

## 2022-01-14 PROCEDURE — 3078F DIAST BP <80 MM HG: CPT | Mod: CPTII,S$GLB,, | Performed by: NURSE PRACTITIONER

## 2022-01-14 PROCEDURE — 99214 PR OFFICE/OUTPT VISIT, EST, LEVL IV, 30-39 MIN: ICD-10-PCS | Mod: S$GLB,,, | Performed by: NURSE PRACTITIONER

## 2022-01-14 PROCEDURE — 1159F MED LIST DOCD IN RCRD: CPT | Mod: CPTII,S$GLB,, | Performed by: NURSE PRACTITIONER

## 2022-01-14 PROCEDURE — 3008F BODY MASS INDEX DOCD: CPT | Mod: CPTII,S$GLB,, | Performed by: NURSE PRACTITIONER

## 2022-01-14 PROCEDURE — 3008F PR BODY MASS INDEX (BMI) DOCUMENTED: ICD-10-PCS | Mod: CPTII,S$GLB,, | Performed by: NURSE PRACTITIONER

## 2022-01-14 PROCEDURE — 3074F PR MOST RECENT SYSTOLIC BLOOD PRESSURE < 130 MM HG: ICD-10-PCS | Mod: CPTII,S$GLB,, | Performed by: NURSE PRACTITIONER

## 2022-01-14 PROCEDURE — 99214 OFFICE O/P EST MOD 30 MIN: CPT | Mod: S$GLB,,, | Performed by: NURSE PRACTITIONER

## 2022-01-14 PROCEDURE — 1159F PR MEDICATION LIST DOCUMENTED IN MEDICAL RECORD: ICD-10-PCS | Mod: CPTII,S$GLB,, | Performed by: NURSE PRACTITIONER

## 2022-01-14 NOTE — PROGRESS NOTES
Subjective:       Patient ID: Shira Gloria is a 61 y.o. female.    Chief Complaint: Back Pain    HPI left kidney area pain. Comes and goes for the past 6-8 weeks. States she fell in December. States she had some swelling in her legs a few weeks ago, better now. First urine of the day is darker, strong. States she hydrates well. Pain worse with sitting. She fell on an escalator. If she puts pressure on the area the pain stops. No radiation of the pain. Motrin helps with the discomfort. At it's worse it is 6/10. No N/V/D. Feels does not know if this is due to the fall or if this is kidney pain. States no fever. See ROS.    The following portion of the patients history was reviewed and updated as appropriate: allergies, current medications, past medical and surgical history. Past social history and problem list reviewed. Family PMH and Past social history reviewed. Tobacco, Illicit drug use reviewed.      Review of patient's allergies indicates:   Allergen Reactions    Cranberry Rash         Current Outpatient Medications:     diltiaZEM (CARDIZEM CD) 240 MG 24 hr capsule, Take 1 capsule (240 mg total) by mouth once daily., Disp: 90 capsule, Rfl: 1    levothyroxine (SYNTHROID) 88 MCG tablet, Take 1 tablet (88 mcg total) by mouth once daily., Disp: 90 tablet, Rfl: 1    Past Medical History:   Diagnosis Date    HTN (hypertension)     Hypothyroidism        History reviewed. No pertinent surgical history.    Social History     Socioeconomic History    Marital status: Single   Tobacco Use    Smoking status: Never Smoker    Smokeless tobacco: Never Used   Substance and Sexual Activity    Alcohol use: Yes     Comment: social         Review of Systems   Constitutional: Negative for fatigue and fever.   HENT: Negative for congestion, postnasal drip, rhinorrhea and voice change.    Eyes: Negative for visual disturbance.   Respiratory: Negative for cough, chest tightness, shortness of breath and wheezing.   "  Cardiovascular: Negative for chest pain, palpitations and leg swelling.   Gastrointestinal: Negative for abdominal pain, blood in stool, constipation, diarrhea, nausea and vomiting.   Genitourinary: Positive for difficulty urinating and flank pain. Negative for decreased urine volume, dysuria, frequency and urgency.        Strong urine odor   Musculoskeletal: Positive for back pain (see HPI). Negative for arthralgias and gait problem.   Skin: Negative for rash and wound.   Neurological: Negative for dizziness, weakness and headaches.   Hematological: Negative for adenopathy. Does not bruise/bleed easily.   Psychiatric/Behavioral: Negative for decreased concentration, dysphoric mood and sleep disturbance. The patient is not nervous/anxious.        Objective:      /74   Pulse 72   Temp 98.7 °F (37.1 °C)   Resp 20   Ht 5' 1" (1.549 m)   Wt 97.8 kg (215 lb 11.5 oz)   SpO2 98%   BMI 40.76 kg/m²      Physical Exam  Constitutional:       General: She is not in acute distress.     Appearance: Normal appearance. She is well-developed and normal weight.   HENT:      Head: Normocephalic.      Mouth/Throat:      Mouth: Mucous membranes are moist.      Pharynx: Oropharynx is clear.   Eyes:      Conjunctiva/sclera: Conjunctivae normal.      Pupils: Pupils are equal, round, and reactive to light.   Neck:      Thyroid: No thyromegaly.      Trachea: Trachea normal. No tracheal tenderness or tracheal deviation.   Cardiovascular:      Rate and Rhythm: Normal rate and regular rhythm.      Pulses: Normal pulses.           Carotid pulses are 2+ on the right side and 2+ on the left side.       Radial pulses are 2+ on the right side and 2+ on the left side.      Heart sounds: Normal heart sounds. No murmur heard.  No gallop.    Pulmonary:      Effort: Pulmonary effort is normal. No respiratory distress.      Breath sounds: Normal breath sounds. No stridor. No wheezing, rhonchi or rales.   Abdominal:      General: Bowel " sounds are normal.      Palpations: Abdomen is soft. There is no splenomegaly or mass.      Tenderness: There is no abdominal tenderness. There is left CVA tenderness. There is no right CVA tenderness or rebound.   Musculoskeletal:      Cervical back: Full passive range of motion without pain, normal range of motion and neck supple. No edema.      Lumbar back: Tenderness present. Normal range of motion. Negative right straight leg raise test and negative left straight leg raise test.      Right lower leg: No edema.      Left lower leg: No edema.      Comments: Gait and coordination normal.  strong, equal. Upper and lower extremity strength normal.    Skin:     General: Skin is warm and dry.      Capillary Refill: Capillary refill takes less than 2 seconds.      Findings: No lesion or rash.   Neurological:      General: No focal deficit present.      Mental Status: She is alert and oriented to person, place, and time.   Psychiatric:         Attention and Perception: Attention and perception normal.         Mood and Affect: Mood and affect normal.         Speech: Speech normal.         Behavior: Behavior normal.         Assessment:       1. Abnormal urine odor    2. Left flank pain    3. Sacroiliitis, not elsewhere classified    4. Primary hypertension    5. JAYDEN on CPAP    6. Morbid obesity with BMI of 40.0-44.9, adult        Plan:       Abnormal urine odor: mild hematuria. No nitrates  -     POCT urine dipstick without microscope    Left flank pain: will send for CT scan to rule out kidney stone.  -     CT Renal Stone Study ABD Pelvis WO; Future; Expected date: 01/14/2022    Sacroiliitis, not elsewhere classified: stable.     Primary hypertension: good BP control.     JAYDEN on CPAP: compliant with Cpap    Morbid obesity with BMI of 40: Weight loss encouraged. Follow low fat, low carb diet. Portion control, exercise.        Continue current medication  Take medications only as prescribed  Healthy diet  Adequate  rest  Adequate hydration  Avoid allergens  Avoid excessive caffeine     follow up after CT completed.

## 2022-01-18 ENCOUNTER — HOSPITAL ENCOUNTER (OUTPATIENT)
Dept: RADIOLOGY | Facility: HOSPITAL | Age: 62
Discharge: HOME | End: 2022-01-18
Attending: NURSE PRACTITIONER
Payer: COMMERCIAL

## 2022-01-18 DIAGNOSIS — R10.9 LEFT FLANK PAIN: ICD-10-CM

## 2022-01-18 PROCEDURE — 74176 CT ABD & PELVIS W/O CONTRAST: CPT | Mod: 26,,, | Performed by: RADIOLOGY

## 2022-01-18 PROCEDURE — 74176 CT RENAL STONE STUDY ABD PELVIS WO: ICD-10-PCS | Mod: 26,,, | Performed by: RADIOLOGY

## 2022-01-18 PROCEDURE — 74176 CT ABD & PELVIS W/O CONTRAST: CPT | Mod: TC,PO

## 2022-01-19 ENCOUNTER — PATIENT MESSAGE (OUTPATIENT)
Dept: FAMILY MEDICINE | Facility: CLINIC | Age: 62
End: 2022-01-19
Payer: COMMERCIAL

## 2022-01-19 DIAGNOSIS — N20.0 RENAL STONE: Primary | ICD-10-CM

## 2022-01-19 DIAGNOSIS — N28.89 OTHER SPECIFIED DISORDERS OF KIDNEY AND URETER: ICD-10-CM

## 2022-01-20 PROBLEM — Z12.39 BREAST CANCER SCREENING: Status: RESOLVED | Noted: 2018-11-02 | Resolved: 2022-01-20

## 2022-01-20 PROBLEM — Z12.11 COLON CANCER SCREENING: Status: RESOLVED | Noted: 2018-11-02 | Resolved: 2022-01-20

## 2022-01-20 PROBLEM — M46.1 SACROILIITIS, NOT ELSEWHERE CLASSIFIED: Status: ACTIVE | Noted: 2022-01-20

## 2022-01-20 PROBLEM — H91.90 HEARING LOSS: Status: RESOLVED | Noted: 2020-11-22 | Resolved: 2022-01-20

## 2022-01-31 ENCOUNTER — PATIENT MESSAGE (OUTPATIENT)
Dept: FAMILY MEDICINE | Facility: CLINIC | Age: 62
End: 2022-01-31
Payer: COMMERCIAL

## 2022-01-31 ENCOUNTER — HOSPITAL ENCOUNTER (OUTPATIENT)
Dept: RADIOLOGY | Facility: HOSPITAL | Age: 62
Discharge: HOME | End: 2022-01-31
Attending: NURSE PRACTITIONER
Payer: COMMERCIAL

## 2022-01-31 DIAGNOSIS — N28.89 OTHER SPECIFIED DISORDERS OF KIDNEY AND URETER: ICD-10-CM

## 2022-01-31 PROCEDURE — 25500020 PHARM REV CODE 255: Mod: PO | Performed by: NURSE PRACTITIONER

## 2022-01-31 PROCEDURE — 74178 CT ABD&PLV WO CNTR FLWD CNTR: CPT | Mod: 26,,, | Performed by: RADIOLOGY

## 2022-01-31 PROCEDURE — 74178 CT UROGRAM ABD PELVIS W WO: ICD-10-PCS | Mod: 26,,, | Performed by: RADIOLOGY

## 2022-01-31 PROCEDURE — 74178 CT ABD&PLV WO CNTR FLWD CNTR: CPT | Mod: TC,PO

## 2022-01-31 RX ADMIN — IOHEXOL 125 ML: 350 INJECTION, SOLUTION INTRAVENOUS at 10:01

## 2022-02-04 ENCOUNTER — OFFICE VISIT (OUTPATIENT)
Dept: UROLOGY | Facility: CLINIC | Age: 62
End: 2022-02-04
Payer: COMMERCIAL

## 2022-02-04 VITALS
BODY MASS INDEX: 40.71 KG/M2 | DIASTOLIC BLOOD PRESSURE: 82 MMHG | SYSTOLIC BLOOD PRESSURE: 142 MMHG | HEIGHT: 61 IN | WEIGHT: 215.63 LBS | HEART RATE: 61 BPM

## 2022-02-04 DIAGNOSIS — N20.0 NEPHROLITHIASIS: Primary | ICD-10-CM

## 2022-02-04 PROCEDURE — 99999 PR PBB SHADOW E&M-EST. PATIENT-LVL III: ICD-10-PCS | Mod: PBBFAC,,, | Performed by: UROLOGY

## 2022-02-04 PROCEDURE — 3079F DIAST BP 80-89 MM HG: CPT | Mod: CPTII,S$GLB,, | Performed by: UROLOGY

## 2022-02-04 PROCEDURE — 1160F RVW MEDS BY RX/DR IN RCRD: CPT | Mod: CPTII,S$GLB,, | Performed by: UROLOGY

## 2022-02-04 PROCEDURE — 3079F PR MOST RECENT DIASTOLIC BLOOD PRESSURE 80-89 MM HG: ICD-10-PCS | Mod: CPTII,S$GLB,, | Performed by: UROLOGY

## 2022-02-04 PROCEDURE — 99999 PR PBB SHADOW E&M-EST. PATIENT-LVL III: CPT | Mod: PBBFAC,,, | Performed by: UROLOGY

## 2022-02-04 PROCEDURE — 3077F SYST BP >= 140 MM HG: CPT | Mod: CPTII,S$GLB,, | Performed by: UROLOGY

## 2022-02-04 PROCEDURE — 1159F MED LIST DOCD IN RCRD: CPT | Mod: CPTII,S$GLB,, | Performed by: UROLOGY

## 2022-02-04 PROCEDURE — 3008F BODY MASS INDEX DOCD: CPT | Mod: CPTII,S$GLB,, | Performed by: UROLOGY

## 2022-02-04 PROCEDURE — 3008F PR BODY MASS INDEX (BMI) DOCUMENTED: ICD-10-PCS | Mod: CPTII,S$GLB,, | Performed by: UROLOGY

## 2022-02-04 PROCEDURE — 1160F PR REVIEW ALL MEDS BY PRESCRIBER/CLIN PHARMACIST DOCUMENTED: ICD-10-PCS | Mod: CPTII,S$GLB,, | Performed by: UROLOGY

## 2022-02-04 PROCEDURE — 1159F PR MEDICATION LIST DOCUMENTED IN MEDICAL RECORD: ICD-10-PCS | Mod: CPTII,S$GLB,, | Performed by: UROLOGY

## 2022-02-04 PROCEDURE — 99204 PR OFFICE/OUTPT VISIT, NEW, LEVL IV, 45-59 MIN: ICD-10-PCS | Mod: S$GLB,,, | Performed by: UROLOGY

## 2022-02-04 PROCEDURE — 99204 OFFICE O/P NEW MOD 45 MIN: CPT | Mod: S$GLB,,, | Performed by: UROLOGY

## 2022-02-04 PROCEDURE — 3077F PR MOST RECENT SYSTOLIC BLOOD PRESSURE >= 140 MM HG: ICD-10-PCS | Mod: CPTII,S$GLB,, | Performed by: UROLOGY

## 2022-02-04 NOTE — PROGRESS NOTES
"[facility] New Patient/H&P:    Shira Gloria is a 61 y.o. female who presents for left nephrolithiasis.    Patient with a history of HTN who was referred by her PCP for left nephrolithiasis. She reports mild intermittent left flank discomfort for approximately 2 months.     She underwent CT renal stone which revealed 2 cm left renal stone in a possible left calyceal diverticulum. Subsequent CT urogram revealed a 1.3 cm aggregate collection of stones confirmed to be in a calyceal diverticulum with no obstruction.     States that she has passed stones in the past without any intervention.     Urine dipstick only with trace protein on 10/22/21.     Denies any fever, chills, severe flank pain, gross hematuria, bone pain, unintentional weight loss,  trauma or history of  malignancy.       Past Medical History:   Diagnosis Date    HTN (hypertension)     Hypothyroidism     Morbid obesity with BMI of 40.0-44.9, adult        History reviewed. No pertinent surgical history.    Family History   Problem Relation Age of Onset    Breast cancer Sister 50       Review of patient's allergies indicates:   Allergen Reactions    Cranberry Rash       Medications Reviewed: see MAR      FOCUSED PHYSICAL EXAM:    Vitals:    02/04/22 1424   BP: (!) 142/82   Pulse: 61     Body mass index is 40.74 kg/m². Weight: 97.8 kg (215 lb 9.8 oz) Height: 5' 1" (154.9 cm)       General: Alert, cooperative, no distress, appears stated age  Abdomen: Soft, non-tender, no CVA tenderness, non-distended      LABS:    Recent Results (from the past 336 hour(s))   Creatinine, Serum    Collection Time: 01/31/22  9:40 AM   Result Value Ref Range    Creatinine 0.8 0.5 - 1.4 mg/dL    eGFR if African American >60 >60 mL/min/1.73 m^2    eGFR if non African American >60 >60 mL/min/1.73 m^2           Assessment/Diagnosis:    1. Nephrolithiasis  X-Ray Abdomen AP 1 View       Plans:    - I spent 45 minutes of the day of this encounter " preparing for, treating and managing this patient. Discussed the etiology and management of the patient's nephrolithiasis. Explained that stone disease is multifactorial and can be secondary to urinary obstruction, urine composition, low urine volume, diet, hypokalemia, DM, hypertension, gout, RTA, UTI and medications. We discussed that stones can be managed with observation, trial of passage, ureteroscopy with laser lithotripsy, ESWL and PCNL. After extensive discussion, patient has decided to proceed with observation of her stones in the left calyceal diverticulum as she is mostly asymptomatic. PCNL should she experience any pain or infection. All risks, benefits and alternatives discussed at length with patient.   - RTC in 1 year with KUB.

## 2022-02-21 ENCOUNTER — PATIENT MESSAGE (OUTPATIENT)
Dept: FAMILY MEDICINE | Facility: CLINIC | Age: 62
End: 2022-02-21
Payer: COMMERCIAL

## 2022-02-22 ENCOUNTER — PATIENT MESSAGE (OUTPATIENT)
Dept: FAMILY MEDICINE | Facility: CLINIC | Age: 62
End: 2022-02-22
Payer: COMMERCIAL

## 2022-02-22 RX ORDER — TRAMADOL HYDROCHLORIDE 50 MG/1
50 TABLET ORAL EVERY 6 HOURS
Qty: 20 EACH | Refills: 0 | Status: SHIPPED | OUTPATIENT
Start: 2022-02-22 | End: 2022-06-12

## 2022-03-17 ENCOUNTER — PATIENT MESSAGE (OUTPATIENT)
Dept: FAMILY MEDICINE | Facility: CLINIC | Age: 62
End: 2022-03-17
Payer: COMMERCIAL

## 2022-03-17 ENCOUNTER — TELEPHONE (OUTPATIENT)
Dept: FAMILY MEDICINE | Facility: CLINIC | Age: 62
End: 2022-03-17
Payer: COMMERCIAL

## 2022-03-31 ENCOUNTER — PATIENT MESSAGE (OUTPATIENT)
Dept: FAMILY MEDICINE | Facility: CLINIC | Age: 62
End: 2022-03-31
Payer: COMMERCIAL

## 2022-03-31 DIAGNOSIS — M25.541 ARTHRALGIA OF BOTH HANDS: Primary | ICD-10-CM

## 2022-03-31 DIAGNOSIS — M25.542 ARTHRALGIA OF BOTH HANDS: Primary | ICD-10-CM

## 2022-04-01 ENCOUNTER — PATIENT MESSAGE (OUTPATIENT)
Dept: FAMILY MEDICINE | Facility: CLINIC | Age: 62
End: 2022-04-01
Payer: COMMERCIAL

## 2022-04-01 ENCOUNTER — TELEPHONE (OUTPATIENT)
Dept: RHEUMATOLOGY | Facility: CLINIC | Age: 62
End: 2022-04-01
Payer: COMMERCIAL

## 2022-04-01 NOTE — TELEPHONE ENCOUNTER
----- Message from Sandrita Melo sent at 3/31/2022  5:18 PM CDT -----  Type:  Patient Returning Call    Who Called:pt    Does the patient know what this is regarding?:sooner appt  Would the patient rather a call back or a response via [facility]  Best Call Back Number:[phone]  Additional Information: Has referral and like to schedule there are no available appt

## 2022-04-13 ENCOUNTER — PATIENT MESSAGE (OUTPATIENT)
Dept: FAMILY MEDICINE | Facility: CLINIC | Age: 62
End: 2022-04-13
Payer: COMMERCIAL

## 2022-04-13 DIAGNOSIS — M25.542 ARTHRALGIA OF BOTH HANDS: Primary | ICD-10-CM

## 2022-04-13 DIAGNOSIS — M25.541 ARTHRALGIA OF BOTH HANDS: Primary | ICD-10-CM

## 2022-04-16 ENCOUNTER — PATIENT MESSAGE (OUTPATIENT)
Dept: FAMILY MEDICINE | Facility: CLINIC | Age: 62
End: 2022-04-16
Payer: COMMERCIAL

## 2022-04-18 ENCOUNTER — PATIENT MESSAGE (OUTPATIENT)
Dept: FAMILY MEDICINE | Facility: CLINIC | Age: 62
End: 2022-04-18
Payer: COMMERCIAL

## 2022-04-19 ENCOUNTER — PATIENT MESSAGE (OUTPATIENT)
Dept: FAMILY MEDICINE | Facility: CLINIC | Age: 62
End: 2022-04-19
Payer: COMMERCIAL

## 2022-04-22 ENCOUNTER — PATIENT MESSAGE (OUTPATIENT)
Dept: FAMILY MEDICINE | Facility: CLINIC | Age: 62
End: 2022-04-22

## 2022-04-22 ENCOUNTER — OFFICE VISIT (OUTPATIENT)
Dept: FAMILY MEDICINE | Facility: CLINIC | Age: 62
End: 2022-04-22
Payer: COMMERCIAL

## 2022-04-22 ENCOUNTER — LAB VISIT (OUTPATIENT)
Dept: LAB | Facility: HOSPITAL | Age: 62
End: 2022-04-22
Payer: COMMERCIAL

## 2022-04-22 VITALS
HEART RATE: 63 BPM | RESPIRATION RATE: 20 BRPM | SYSTOLIC BLOOD PRESSURE: 126 MMHG | TEMPERATURE: 99 F | DIASTOLIC BLOOD PRESSURE: 72 MMHG | HEIGHT: 61 IN | BODY MASS INDEX: 40.6 KG/M2 | WEIGHT: 215.06 LBS | OXYGEN SATURATION: 96 %

## 2022-04-22 DIAGNOSIS — E66.01 MORBID OBESITY WITH BMI OF 40.0-44.9, ADULT: ICD-10-CM

## 2022-04-22 DIAGNOSIS — E03.4 HYPOTHYROIDISM DUE TO ACQUIRED ATROPHY OF THYROID: ICD-10-CM

## 2022-04-22 DIAGNOSIS — M25.50 ARTHRALGIA, UNSPECIFIED JOINT: ICD-10-CM

## 2022-04-22 DIAGNOSIS — M25.542 ARTHRALGIA OF BOTH HANDS: ICD-10-CM

## 2022-04-22 DIAGNOSIS — G47.33 OSA ON CPAP: ICD-10-CM

## 2022-04-22 DIAGNOSIS — I10 PRIMARY HYPERTENSION: Primary | ICD-10-CM

## 2022-04-22 DIAGNOSIS — M25.541 ARTHRALGIA OF BOTH HANDS: ICD-10-CM

## 2022-04-22 LAB
CRP SERPL-MCNC: 5.5 MG/L (ref 0–8.2)
ERYTHROCYTE [SEDIMENTATION RATE] IN BLOOD BY WESTERGREN METHOD: 29 MM/HR (ref 0–36)
RHEUMATOID FACT SERPL-ACNC: 77 IU/ML (ref 0–15)

## 2022-04-22 PROCEDURE — 3008F PR BODY MASS INDEX (BMI) DOCUMENTED: ICD-10-PCS | Mod: CPTII,S$GLB,, | Performed by: NURSE PRACTITIONER

## 2022-04-22 PROCEDURE — 99214 OFFICE O/P EST MOD 30 MIN: CPT | Mod: S$GLB,,, | Performed by: NURSE PRACTITIONER

## 2022-04-22 PROCEDURE — 99214 PR OFFICE/OUTPT VISIT, EST, LEVL IV, 30-39 MIN: ICD-10-PCS | Mod: S$GLB,,, | Performed by: NURSE PRACTITIONER

## 2022-04-22 PROCEDURE — 36415 COLL VENOUS BLD VENIPUNCTURE: CPT | Mod: PO | Performed by: NURSE PRACTITIONER

## 2022-04-22 PROCEDURE — 3078F PR MOST RECENT DIASTOLIC BLOOD PRESSURE < 80 MM HG: ICD-10-PCS | Mod: CPTII,S$GLB,, | Performed by: NURSE PRACTITIONER

## 2022-04-22 PROCEDURE — 3078F DIAST BP <80 MM HG: CPT | Mod: CPTII,S$GLB,, | Performed by: NURSE PRACTITIONER

## 2022-04-22 PROCEDURE — 86140 C-REACTIVE PROTEIN: CPT | Performed by: NURSE PRACTITIONER

## 2022-04-22 PROCEDURE — 86235 NUCLEAR ANTIGEN ANTIBODY: CPT | Performed by: NURSE PRACTITIONER

## 2022-04-22 PROCEDURE — 3008F BODY MASS INDEX DOCD: CPT | Mod: CPTII,S$GLB,, | Performed by: NURSE PRACTITIONER

## 2022-04-22 PROCEDURE — 86038 ANTINUCLEAR ANTIBODIES: CPT | Performed by: NURSE PRACTITIONER

## 2022-04-22 PROCEDURE — 1159F PR MEDICATION LIST DOCUMENTED IN MEDICAL RECORD: ICD-10-PCS | Mod: CPTII,S$GLB,, | Performed by: NURSE PRACTITIONER

## 2022-04-22 PROCEDURE — 85652 RBC SED RATE AUTOMATED: CPT | Performed by: NURSE PRACTITIONER

## 2022-04-22 PROCEDURE — 1160F PR REVIEW ALL MEDS BY PRESCRIBER/CLIN PHARMACIST DOCUMENTED: ICD-10-PCS | Mod: CPTII,S$GLB,, | Performed by: NURSE PRACTITIONER

## 2022-04-22 PROCEDURE — 3074F SYST BP LT 130 MM HG: CPT | Mod: CPTII,S$GLB,, | Performed by: NURSE PRACTITIONER

## 2022-04-22 PROCEDURE — 1159F MED LIST DOCD IN RCRD: CPT | Mod: CPTII,S$GLB,, | Performed by: NURSE PRACTITIONER

## 2022-04-22 PROCEDURE — 1160F RVW MEDS BY RX/DR IN RCRD: CPT | Mod: CPTII,S$GLB,, | Performed by: NURSE PRACTITIONER

## 2022-04-22 PROCEDURE — 86431 RHEUMATOID FACTOR QUANT: CPT | Performed by: NURSE PRACTITIONER

## 2022-04-22 PROCEDURE — 3074F PR MOST RECENT SYSTOLIC BLOOD PRESSURE < 130 MM HG: ICD-10-PCS | Mod: CPTII,S$GLB,, | Performed by: NURSE PRACTITIONER

## 2022-04-22 PROCEDURE — 86039 ANTINUCLEAR ANTIBODIES (ANA): CPT | Performed by: NURSE PRACTITIONER

## 2022-04-22 RX ORDER — DILTIAZEM HYDROCHLORIDE 240 MG/1
240 CAPSULE, COATED, EXTENDED RELEASE ORAL DAILY
Qty: 90 CAPSULE | Refills: 1 | Status: SHIPPED | OUTPATIENT
Start: 2022-04-22 | End: 2022-10-21

## 2022-04-22 RX ORDER — LEVOTHYROXINE SODIUM 88 UG/1
88 TABLET ORAL DAILY
Qty: 90 TABLET | Refills: 1 | Status: SHIPPED | OUTPATIENT
Start: 2022-04-22 | End: 2022-10-28 | Stop reason: SDUPTHER

## 2022-04-22 NOTE — PROGRESS NOTES
Answers for HPI/ROS submitted by the patient on 4/20/2022  Chronicity: chronic  Onset: more than 1 year ago  Progression since onset: unchanged  Condition status: controlled  anxiety: No  blurred vision: No  chest pain: No  headaches: No  malaise/fatigue: No  neck pain: No  orthopnea: No  palpitations: No  peripheral edema: Yes  PND: No  shortness of breath: No  sweats: No  Agents associated with hypertension: thyroid hormones  CAD risks: obesity, post-menopausal state, sedentary lifestyle  Compliance problems: no compliance problems    Subjective:       Patient ID: Shira Gloria is a 61 y.o. female.    Chief Complaint: Follow-up    HPI Here for follow up. States doing well on current medications. Has good BP control. She is requesting refills on her medications.     Flare ups of joint pain at times. Lasting longer. No pain at this time. Will go through episodes. Shoulders, then hands, hip. States it seems to migrate around.    Mother with carpal tunnnel. Family history of lupus. She had labs done before this visit, results not available yet. Checking arthritis markers. See ROS.    The following portion of the patients history was reviewed and updated as appropriate: allergies, current medications, past medical and surgical history. Past social history and problem list reviewed. Family PMH and Past social history reviewed. Tobacco, Illicit drug use reviewed.      Review of patient's allergies indicates:   Allergen Reactions    Cranberry Rash         Current Outpatient Medications:     diltiaZEM (CARDIZEM CD) 240 MG 24 hr capsule, Take 1 capsule (240 mg total) by mouth once daily., Disp: 90 capsule, Rfl: 1    levothyroxine (SYNTHROID) 88 MCG tablet, Take 1 tablet (88 mcg total) by mouth once daily., Disp: 90 tablet, Rfl: 1    traMADoL (ULTRAM) 50 mg tablet, Take 1 tablet (50 mg total) by mouth every 6 (six) hours., Disp: 20 each, Rfl: 0    Past Medical History:   Diagnosis Date    HTN (hypertension)      "Hypothyroidism     Morbid obesity with BMI of 40.0-44.9, adult        History reviewed. No pertinent surgical history.    Social History     Socioeconomic History    Marital status: Significant Other   Tobacco Use    Smoking status: Never Smoker    Smokeless tobacco: Never Used   Substance and Sexual Activity    Alcohol use: Yes     Comment: social         Review of Systems   Constitutional: Negative for fatigue and fever.   HENT: Negative for congestion, postnasal drip, rhinorrhea and voice change.    Eyes: Negative for visual disturbance.   Respiratory: Negative for cough, chest tightness, shortness of breath and wheezing.    Cardiovascular: Negative for chest pain, palpitations and leg swelling.   Gastrointestinal: Negative for abdominal pain, blood in stool, constipation, diarrhea, nausea and vomiting.   Genitourinary: Negative for difficulty urinating and dysuria.   Musculoskeletal: Positive for arthralgias (see HPI. no pain at this time). Negative for back pain, gait problem and neck pain.   Skin: Negative for rash and wound.   Neurological: Negative for dizziness, weakness and headaches.   Hematological: Negative for adenopathy. Does not bruise/bleed easily.   Psychiatric/Behavioral: Negative for decreased concentration, dysphoric mood and sleep disturbance. The patient is not nervous/anxious.        Objective:      /72   Pulse 63   Temp 99 °F (37.2 °C)   Resp 20   Ht 5' 1" (1.549 m)   Wt 97.6 kg (215 lb 0.9 oz)   SpO2 96%   BMI 40.63 kg/m²      Physical Exam  Constitutional:       General: She is not in acute distress.     Appearance: Normal appearance. She is well-developed. She is morbidly obese.   HENT:      Head: Normocephalic.      Mouth/Throat:      Mouth: Mucous membranes are moist.      Pharynx: Oropharynx is clear.   Eyes:      Conjunctiva/sclera: Conjunctivae normal.      Pupils: Pupils are equal, round, and reactive to light.   Neck:      Thyroid: No thyromegaly.      Vascular: " No carotid bruit or JVD.      Trachea: Trachea normal. No tracheal tenderness or tracheal deviation.   Cardiovascular:      Rate and Rhythm: Normal rate and regular rhythm.      Pulses:           Carotid pulses are 2+ on the right side and 2+ on the left side.       Radial pulses are 2+ on the right side and 2+ on the left side.      Heart sounds: Normal heart sounds. No murmur heard.    No gallop.   Pulmonary:      Effort: Pulmonary effort is normal. No respiratory distress.      Breath sounds: Normal breath sounds. No stridor. No wheezing, rhonchi or rales.   Abdominal:      General: Bowel sounds are normal.      Palpations: Abdomen is soft. There is no splenomegaly or mass.      Tenderness: There is no abdominal tenderness. There is no rebound.   Musculoskeletal:         General: Normal range of motion.      Cervical back: Full passive range of motion without pain, normal range of motion and neck supple. No edema.      Right lower leg: No edema.      Left lower leg: No edema.      Comments: Gait and coordination normal.  strong, equal. Upper and lower extremity strength normal.    Skin:     General: Skin is warm and dry.      Capillary Refill: Capillary refill takes less than 2 seconds.      Findings: No lesion or rash.   Neurological:      General: No focal deficit present.      Mental Status: She is alert and oriented to person, place, and time.   Psychiatric:         Attention and Perception: Attention and perception normal.         Mood and Affect: Mood and affect normal.         Speech: Speech normal.         Behavior: Behavior normal.         Assessment:       1. Primary hypertension    2. Hypothyroidism due to acquired atrophy of thyroid    3. Morbid obesity with BMI of 40.0-44.9, adult    4. JAYDEN on CPAP    5. Arthralgia, unspecified joint        Plan:       Primary hypertension: good BP control. Continue current medications.     Hypothyroidism due to acquired atrophy of thyroid: continue current  dose, will adjust based on upcoming lab results    Morbid obesity with BMI of 40.0-44.9, adult: Weight loss encouraged. Follow low fat, low carb diet. Portion control, exercise.     JAYDEN on CPAP: compliant with Cpap use    Arthralgia, unspecified joint: waiting on lab results.    Other orders  -     diltiaZEM (CARDIZEM CD) 240 MG 24 hr capsule; Take 1 capsule (240 mg total) by mouth once daily.  Dispense: 90 capsule; Refill: 1  -     levothyroxine (SYNTHROID) 88 MCG tablet; Take 1 tablet (88 mcg total) by mouth once daily.  Dispense: 90 tablet; Refill: 1       Continue current medication  Take medications only as prescribed  Healthy diet, exercise  Adequate rest  Adequate hydration  Avoid allergens  Avoid excessive caffeine     follow up 6 months.

## 2022-04-25 ENCOUNTER — PATIENT MESSAGE (OUTPATIENT)
Dept: FAMILY MEDICINE | Facility: CLINIC | Age: 62
End: 2022-04-25
Payer: COMMERCIAL

## 2022-04-25 LAB
ANA PATTERN 1: NORMAL
ANA SER QL IF: POSITIVE
ANA TITR SER IF: NORMAL {TITER}

## 2022-04-27 ENCOUNTER — PATIENT MESSAGE (OUTPATIENT)
Dept: FAMILY MEDICINE | Facility: CLINIC | Age: 62
End: 2022-04-27
Payer: COMMERCIAL

## 2022-04-27 LAB
ANTI SM ANTIBODY: 0.07 RATIO (ref 0–0.99)
ANTI SM/RNP ANTIBODY: 0.08 RATIO (ref 0–0.99)
ANTI-SM INTERPRETATION: NEGATIVE
ANTI-SM/RNP INTERPRETATION: NEGATIVE
ANTI-SSA ANTIBODY: 0.05 RATIO (ref 0–0.99)
ANTI-SSA INTERPRETATION: NEGATIVE
ANTI-SSB ANTIBODY: 0.04 RATIO (ref 0–0.99)
ANTI-SSB INTERPRETATION: NEGATIVE
DSDNA AB SER-ACNC: NORMAL [IU]/ML

## 2022-04-29 PROBLEM — E66.812 CLASS 2 SEVERE OBESITY DUE TO EXCESS CALORIES WITH SERIOUS COMORBIDITY AND BODY MASS INDEX (BMI) OF 39.0 TO 39.9 IN ADULT: Status: RESOLVED | Noted: 2018-03-01 | Resolved: 2022-04-29

## 2022-04-29 PROBLEM — E66.01 CLASS 2 SEVERE OBESITY DUE TO EXCESS CALORIES WITH SERIOUS COMORBIDITY AND BODY MASS INDEX (BMI) OF 39.0 TO 39.9 IN ADULT: Status: RESOLVED | Noted: 2018-03-01 | Resolved: 2022-04-29

## 2022-06-14 ENCOUNTER — OFFICE VISIT (OUTPATIENT)
Dept: RHEUMATOLOGY | Facility: CLINIC | Age: 62
End: 2022-06-14
Payer: COMMERCIAL

## 2022-06-14 VITALS
DIASTOLIC BLOOD PRESSURE: 89 MMHG | WEIGHT: 218.06 LBS | HEART RATE: 59 BPM | HEIGHT: 61 IN | BODY MASS INDEX: 41.17 KG/M2 | SYSTOLIC BLOOD PRESSURE: 144 MMHG

## 2022-06-14 DIAGNOSIS — M25.541 ARTHRALGIA OF BOTH HANDS: ICD-10-CM

## 2022-06-14 DIAGNOSIS — M25.542 ARTHRALGIA OF BOTH HANDS: ICD-10-CM

## 2022-06-14 DIAGNOSIS — R05.9 COUGH: Primary | ICD-10-CM

## 2022-06-14 PROCEDURE — 99205 OFFICE O/P NEW HI 60 MIN: CPT | Mod: S$GLB,,, | Performed by: INTERNAL MEDICINE

## 2022-06-14 PROCEDURE — 3079F PR MOST RECENT DIASTOLIC BLOOD PRESSURE 80-89 MM HG: ICD-10-PCS | Mod: CPTII,S$GLB,, | Performed by: INTERNAL MEDICINE

## 2022-06-14 PROCEDURE — 3008F PR BODY MASS INDEX (BMI) DOCUMENTED: ICD-10-PCS | Mod: CPTII,S$GLB,, | Performed by: INTERNAL MEDICINE

## 2022-06-14 PROCEDURE — 1159F MED LIST DOCD IN RCRD: CPT | Mod: CPTII,S$GLB,, | Performed by: INTERNAL MEDICINE

## 2022-06-14 PROCEDURE — 3079F DIAST BP 80-89 MM HG: CPT | Mod: CPTII,S$GLB,, | Performed by: INTERNAL MEDICINE

## 2022-06-14 PROCEDURE — 99999 PR PBB SHADOW E&M-EST. PATIENT-LVL IV: ICD-10-PCS | Mod: PBBFAC,,, | Performed by: INTERNAL MEDICINE

## 2022-06-14 PROCEDURE — 1159F PR MEDICATION LIST DOCUMENTED IN MEDICAL RECORD: ICD-10-PCS | Mod: CPTII,S$GLB,, | Performed by: INTERNAL MEDICINE

## 2022-06-14 PROCEDURE — 99999 PR PBB SHADOW E&M-EST. PATIENT-LVL IV: CPT | Mod: PBBFAC,,, | Performed by: INTERNAL MEDICINE

## 2022-06-14 PROCEDURE — 3077F SYST BP >= 140 MM HG: CPT | Mod: CPTII,S$GLB,, | Performed by: INTERNAL MEDICINE

## 2022-06-14 PROCEDURE — 99205 PR OFFICE/OUTPT VISIT, NEW, LEVL V, 60-74 MIN: ICD-10-PCS | Mod: S$GLB,,, | Performed by: INTERNAL MEDICINE

## 2022-06-14 PROCEDURE — 3077F PR MOST RECENT SYSTOLIC BLOOD PRESSURE >= 140 MM HG: ICD-10-PCS | Mod: CPTII,S$GLB,, | Performed by: INTERNAL MEDICINE

## 2022-06-14 PROCEDURE — 3008F BODY MASS INDEX DOCD: CPT | Mod: CPTII,S$GLB,, | Performed by: INTERNAL MEDICINE

## 2022-06-14 NOTE — PROGRESS NOTES
Subjective:       Patient ID: Shira Gloria is a 61 y.o. female.    Chief Complaint: Disease Management    HPI  61 year old F with PMH of hypothyroidism, HTN, JAYDEN, nephrolithiasis here for evaluation.She has been having pain for 18 months.  She has pain in shoulders, wrists, hands, and hips.   Pain level can be as high as 8/10. She takes advil 2 pills daily.  She reports ice is most helpful. She used to take meloxicam and it did not help her pain. Tramadol does not help her pain.  Denies any hair loss, oral ulcers, fevers, or raynauds.    Denies taking steroids.  Family hx: negative for RA  Grandmother: RA        Review of Systems   Constitutional: Negative for activity change, appetite change, chills, diaphoresis, fatigue and fever.   HENT: Negative for congestion, ear discharge, ear pain, facial swelling, mouth sores, sinus pressure, sneezing, sore throat, tinnitus and trouble swallowing.    Eyes: Negative for photophobia, pain, discharge, redness, itching and visual disturbance.   Respiratory: Negative for apnea, cough, chest tightness, shortness of breath, wheezing and stridor.    Cardiovascular: Negative for chest pain and leg swelling.   Gastrointestinal: Negative for abdominal distention, abdominal pain, anal bleeding, blood in stool, constipation, diarrhea and nausea.   Endocrine: Negative for cold intolerance and heat intolerance.   Genitourinary: Negative for difficulty urinating, dysuria and genital sores.   Musculoskeletal: Positive for arthralgias and joint swelling. Negative for back pain, gait problem, myalgias, neck pain and neck stiffness.   Skin: Negative for color change, pallor, rash and wound.   Neurological: Negative for dizziness, seizures, light-headedness, numbness and headaches.   Hematological: Negative for adenopathy. Does not bruise/bleed easily.   Psychiatric/Behavioral: Negative for sleep disturbance. The patient is not nervous/anxious.            Objective:   BP (!) 144/89   Pulse  "(!) 59   Ht 5' 1" (1.549 m)   Wt 98.9 kg (218 lb 0.6 oz)   BMI 41.20 kg/m²      Physical Exam   Constitutional: She is oriented to person, place, and time.   HENT:   Head: Normocephalic and atraumatic.   Right Ear: External ear normal.   Left Ear: External ear normal.   Nose: Nose normal.   Mouth/Throat: Oropharynx is clear and moist. No oropharyngeal exudate.   Eyes: Pupils are equal, round, and reactive to light. Conjunctivae are normal. Right eye exhibits no discharge. Left eye exhibits no discharge. No scleral icterus.   Neck: No JVD present. No thyromegaly present.   Cardiovascular: Normal rate, regular rhythm and normal heart sounds. Exam reveals no gallop and no friction rub.   No murmur heard.  Pulmonary/Chest: Effort normal and breath sounds normal. No respiratory distress. She has no wheezes. She has no rales. She exhibits no tenderness.   Abdominal: Soft. Bowel sounds are normal. She exhibits no distension and no mass. There is no abdominal tenderness. There is no rebound and no guarding.   Musculoskeletal:         General: No swelling, tenderness, deformity or signs of injury.      Cervical back: Neck supple.   Lymphadenopathy:     She has no cervical adenopathy.   Neurological: She is alert and oriented to person, place, and time. No cranial nerve deficit. Gait normal. Coordination normal.   Skin: Skin is dry. No rash noted. No erythema. No pallor.   Psychiatric: Affect and judgment normal.        No data to display     Assessment:      61 year old F with PMH of hypothyroidism, HTN, JAYDEN, nephrolithiasis here for evaluation of polyarthralgias.  She has +RF. Today, I do not detect much synovitis on exam.  I will get baseline labs and xrays and if negative, will proceed with MRI of hands.    1. Arthralgia of both hands            Plan:     labs  xrays    Problem List Items Addressed This Visit    None     Visit Diagnoses     Arthralgia of both hands            *  60 * minutes of total time spent on the " encounter, which includes face to face time and non-face to face time preparing to see the patient (eg, review of tests), Obtaining and/or reviewing separately obtained history, Documenting clinical information in the electronic or other health record, Independently interpreting results (not separately reported) and communicating results to the patient/family/caregiver, or Care coordination (not separately reported).

## 2022-06-14 NOTE — PROGRESS NOTES
Answers for HPI/ROS submitted by the patient on 6/11/2022  fever: No  eye redness: No  mouth sores: No  headaches: No  shortness of breath: No  chest pain: No  trouble swallowing: No  diarrhea: No  constipation: No  unexpected weight change: No  genital sore: No  dysuria: No  During the last 3 days, have you had a skin rash?: No  Bruises or bleeds easily: No  cough: No

## 2022-06-15 ENCOUNTER — HOSPITAL ENCOUNTER (OUTPATIENT)
Dept: RADIOLOGY | Facility: HOSPITAL | Age: 62
Discharge: HOME | End: 2022-06-15
Attending: INTERNAL MEDICINE
Payer: COMMERCIAL

## 2022-06-15 DIAGNOSIS — R05.9 COUGH: ICD-10-CM

## 2022-06-15 PROCEDURE — 77077 JOINT SURVEY SINGLE VIEW: CPT | Mod: TC,PO

## 2022-06-15 PROCEDURE — 71046 XR CHEST PA AND LATERAL: ICD-10-PCS | Mod: 26,,, | Performed by: RADIOLOGY

## 2022-06-15 PROCEDURE — 71046 X-RAY EXAM CHEST 2 VIEWS: CPT | Mod: 26,,, | Performed by: RADIOLOGY

## 2022-06-15 PROCEDURE — 77077 JOINT SURVEY SINGLE VIEW: CPT | Mod: 26,,, | Performed by: RADIOLOGY

## 2022-06-15 PROCEDURE — 77077 XR ARTHRITIS SURVEY: ICD-10-PCS | Mod: 26,,, | Performed by: RADIOLOGY

## 2022-06-15 PROCEDURE — 71046 X-RAY EXAM CHEST 2 VIEWS: CPT | Mod: TC,FY,PO

## 2022-06-16 ENCOUNTER — PATIENT MESSAGE (OUTPATIENT)
Dept: RHEUMATOLOGY | Facility: CLINIC | Age: 62
End: 2022-06-16
Payer: COMMERCIAL

## 2022-06-16 DIAGNOSIS — M25.542 ARTHRALGIA OF BOTH HANDS: Primary | ICD-10-CM

## 2022-06-16 DIAGNOSIS — M25.541 ARTHRALGIA OF BOTH HANDS: Primary | ICD-10-CM

## 2022-06-23 ENCOUNTER — LAB VISIT (OUTPATIENT)
Dept: LAB | Facility: HOSPITAL | Age: 62
End: 2022-06-23
Attending: INTERNAL MEDICINE
Payer: COMMERCIAL

## 2022-06-23 DIAGNOSIS — M25.541 ARTHRALGIA OF BOTH HANDS: ICD-10-CM

## 2022-06-23 DIAGNOSIS — M25.542 ARTHRALGIA OF BOTH HANDS: ICD-10-CM

## 2022-06-23 LAB
CCP AB SER IA-ACNC: 841.5 U/ML
CRP SERPL-MCNC: 5.6 MG/L (ref 0–8.2)
ERYTHROCYTE [SEDIMENTATION RATE] IN BLOOD BY WESTERGREN METHOD: 9 MM/HR (ref 0–36)
RHEUMATOID FACT SERPL-ACNC: 74 IU/ML (ref 0–15)

## 2022-06-23 PROCEDURE — 86140 C-REACTIVE PROTEIN: CPT | Performed by: INTERNAL MEDICINE

## 2022-06-23 PROCEDURE — 86200 CCP ANTIBODY: CPT | Performed by: INTERNAL MEDICINE

## 2022-06-23 PROCEDURE — 85652 RBC SED RATE AUTOMATED: CPT | Performed by: INTERNAL MEDICINE

## 2022-06-23 PROCEDURE — 36415 COLL VENOUS BLD VENIPUNCTURE: CPT | Mod: PO | Performed by: INTERNAL MEDICINE

## 2022-06-23 PROCEDURE — 86431 RHEUMATOID FACTOR QUANT: CPT | Performed by: INTERNAL MEDICINE

## 2022-07-27 DIAGNOSIS — U07.1 COVID-19 VIRUS DETECTED: ICD-10-CM

## 2022-08-01 ENCOUNTER — PATIENT MESSAGE (OUTPATIENT)
Dept: RHEUMATOLOGY | Facility: CLINIC | Age: 62
End: 2022-08-01
Payer: COMMERCIAL

## 2022-08-02 ENCOUNTER — OFFICE VISIT (OUTPATIENT)
Dept: RHEUMATOLOGY | Facility: CLINIC | Age: 62
End: 2022-08-02
Payer: COMMERCIAL

## 2022-08-02 DIAGNOSIS — M25.542 ARTHRALGIA OF BOTH HANDS: Primary | ICD-10-CM

## 2022-08-02 DIAGNOSIS — M79.89 BILATERAL HAND SWELLING: Primary | ICD-10-CM

## 2022-08-02 DIAGNOSIS — M79.641 BILATERAL HAND PAIN: ICD-10-CM

## 2022-08-02 DIAGNOSIS — M79.642 BILATERAL HAND PAIN: ICD-10-CM

## 2022-08-02 DIAGNOSIS — M25.541 ARTHRALGIA OF BOTH HANDS: Primary | ICD-10-CM

## 2022-08-02 PROCEDURE — 99214 OFFICE O/P EST MOD 30 MIN: CPT | Mod: 95,,, | Performed by: INTERNAL MEDICINE

## 2022-08-02 PROCEDURE — 99214 PR OFFICE/OUTPT VISIT, EST, LEVL IV, 30-39 MIN: ICD-10-PCS | Mod: 95,,, | Performed by: INTERNAL MEDICINE

## 2022-08-02 NOTE — PROGRESS NOTES
Answers for HPI/ROS submitted by the patient on 8/2/2022  fever: No  eye redness: No  mouth sores: No  headaches: No  shortness of breath: No  chest pain: No  trouble swallowing: No  diarrhea: No  constipation: No  unexpected weight change: No  genital sore: No  dysuria: No  During the last 3 days, have you had a skin rash?: No  Bruises or bleeds easily: No  cough: No

## 2022-08-02 NOTE — PROGRESS NOTES
Subjective:       Patient ID: Shira Gloria is a 61 y.o. female.    Chief Complaint: Disease Management    HPI  61 year old F with PMH of hypothyroidism, HTN, JAYDEN, nephrolithiasis here for evaluation.She has been having pain for 18 months.  She has pain in shoulders, wrists, hands, and hips.   Pain level can be as high as 8/10. She takes advil 2 pills daily.  She reports ice is most helpful. She used to take meloxicam and it did not help her pain. Tramadol does not help her pain.  Denies any hair loss, oral ulcers, fevers, or raynauds.    Denies taking steroids.  Family hx: negative for RA  Grandmother: RA      Interval history: She has mild to no pain every day.   She does not notice swelling. Reports morning stiffness for an hour.      Review of Systems   Constitutional: Negative for activity change, appetite change, chills, diaphoresis, fatigue and fever.   HENT: Negative for congestion, ear discharge, ear pain, facial swelling, mouth sores, sinus pressure, sneezing, sore throat, tinnitus and trouble swallowing.    Eyes: Negative for photophobia, pain, discharge, redness, itching and visual disturbance.   Respiratory: Negative for apnea, cough, chest tightness, shortness of breath, wheezing and stridor.    Cardiovascular: Negative for chest pain and leg swelling.   Gastrointestinal: Negative for abdominal distention, abdominal pain, anal bleeding, blood in stool, constipation, diarrhea and nausea.   Endocrine: Negative for cold intolerance and heat intolerance.   Genitourinary: Negative for difficulty urinating, dysuria and genital sores.   Musculoskeletal: Positive for arthralgias and joint swelling. Negative for back pain, gait problem, myalgias, neck pain and neck stiffness.   Skin: Negative for color change, pallor, rash and wound.   Neurological: Negative for dizziness, seizures, light-headedness, numbness and headaches.   Hematological: Negative for adenopathy. Does not bruise/bleed easily.    Psychiatric/Behavioral: Negative for sleep disturbance. The patient is not nervous/anxious.            Objective:     Physical Exam   Constitutional: She is oriented to person, place, and time.   HENT:   Head: Normocephalic and atraumatic.   Right Ear: External ear normal.   Left Ear: External ear normal.   Nose: Nose normal.   Mouth/Throat: Oropharynx is clear and moist. No oropharyngeal exudate.   Eyes: Pupils are equal, round, and reactive to light. Conjunctivae are normal. Right eye exhibits no discharge. Left eye exhibits no discharge. No scleral icterus.   Neck: No JVD present. No thyromegaly present.   Cardiovascular: Normal rate, regular rhythm and normal heart sounds. Exam reveals no gallop and no friction rub.   No murmur heard.  Pulmonary/Chest: Effort normal and breath sounds normal. No respiratory distress. She has no wheezes. She has no rales. She exhibits no tenderness.   Abdominal: Soft. Bowel sounds are normal. She exhibits no distension and no mass. There is no abdominal tenderness. There is no rebound and no guarding.   Musculoskeletal:         General: No swelling, tenderness, deformity or signs of injury.      Cervical back: Neck supple.   Lymphadenopathy:     She has no cervical adenopathy.   Neurological: She is alert and oriented to person, place, and time. No cranial nerve deficit. Gait normal. Coordination normal.   Skin: Skin is dry. No rash noted. No erythema. No pallor.   Psychiatric: Affect and judgment normal.        No data to display     Assessment:      61 year old F with PMH of hypothyroidism, HTN, JAYDEN, nephrolithiasis here for evaluation of polyarthralgias.  She has +RF and +CCP but I detected no synovitis on initial visit.  I'll l proceed with MRI of hands.    1. Arthralgia of both hands            Plan:     MRI hands  rtc in 6 months      30 * minutes of total time spent on the encounter, which includes face to face time and non-face to face time preparing to see the patient  (eg, review of tests), Obtaining and/or reviewing separately obtained history, Documenting clinical information in the electronic or other health record, Independently interpreting results (not separately reported) and communicating results to the patient/family/caregiver, or Care coordination (not separately reported).

## 2022-08-04 ENCOUNTER — PATIENT MESSAGE (OUTPATIENT)
Dept: RHEUMATOLOGY | Facility: CLINIC | Age: 62
End: 2022-08-04
Payer: COMMERCIAL

## 2022-08-09 ENCOUNTER — PATIENT MESSAGE (OUTPATIENT)
Dept: RHEUMATOLOGY | Facility: CLINIC | Age: 62
End: 2022-08-09
Payer: COMMERCIAL

## 2022-08-10 ENCOUNTER — PATIENT MESSAGE (OUTPATIENT)
Dept: RHEUMATOLOGY | Facility: CLINIC | Age: 62
End: 2022-08-10
Payer: COMMERCIAL

## 2022-08-12 DIAGNOSIS — M79.642 BILATERAL HAND PAIN: Primary | ICD-10-CM

## 2022-08-12 DIAGNOSIS — M79.641 BILATERAL HAND PAIN: Primary | ICD-10-CM

## 2022-08-15 ENCOUNTER — HOSPITAL ENCOUNTER (OUTPATIENT)
Dept: RADIOLOGY | Facility: HOSPITAL | Age: 62
Discharge: HOME | End: 2022-08-15
Attending: INTERNAL MEDICINE
Payer: COMMERCIAL

## 2022-08-15 DIAGNOSIS — M79.642 BILATERAL HAND PAIN: ICD-10-CM

## 2022-08-15 DIAGNOSIS — M79.89 BILATERAL HAND SWELLING: ICD-10-CM

## 2022-08-15 DIAGNOSIS — M79.641 BILATERAL HAND PAIN: ICD-10-CM

## 2022-08-15 RX ORDER — GADOBUTROL 604.72 MG/ML
9 INJECTION INTRAVENOUS
Status: DISCONTINUED | OUTPATIENT
Start: 2022-08-15 | End: 2022-08-16 | Stop reason: HOSPADM

## 2022-10-28 ENCOUNTER — OFFICE VISIT (OUTPATIENT)
Dept: FAMILY MEDICINE | Facility: CLINIC | Age: 62
End: 2022-10-28
Payer: COMMERCIAL

## 2022-10-28 VITALS
HEART RATE: 64 BPM | WEIGHT: 210.56 LBS | TEMPERATURE: 98 F | DIASTOLIC BLOOD PRESSURE: 74 MMHG | SYSTOLIC BLOOD PRESSURE: 122 MMHG | RESPIRATION RATE: 18 BRPM | BODY MASS INDEX: 39.75 KG/M2 | HEIGHT: 61 IN | OXYGEN SATURATION: 96 %

## 2022-10-28 DIAGNOSIS — Z12.31 ENCOUNTER FOR SCREENING MAMMOGRAM FOR MALIGNANT NEOPLASM OF BREAST: ICD-10-CM

## 2022-10-28 DIAGNOSIS — M05.741 RHEUMATOID ARTHRITIS INVOLVING BOTH HANDS WITH POSITIVE RHEUMATOID FACTOR: ICD-10-CM

## 2022-10-28 DIAGNOSIS — I10 HYPERTENSION, ESSENTIAL: Primary | ICD-10-CM

## 2022-10-28 DIAGNOSIS — E03.4 HYPOTHYROIDISM DUE TO ACQUIRED ATROPHY OF THYROID: ICD-10-CM

## 2022-10-28 DIAGNOSIS — E04.9 ENLARGED THYROID: ICD-10-CM

## 2022-10-28 DIAGNOSIS — G47.33 OSA ON CPAP: ICD-10-CM

## 2022-10-28 DIAGNOSIS — M05.742 RHEUMATOID ARTHRITIS INVOLVING BOTH HANDS WITH POSITIVE RHEUMATOID FACTOR: ICD-10-CM

## 2022-10-28 PROCEDURE — 3074F PR MOST RECENT SYSTOLIC BLOOD PRESSURE < 130 MM HG: ICD-10-PCS | Mod: CPTII,S$GLB,, | Performed by: NURSE PRACTITIONER

## 2022-10-28 PROCEDURE — 99214 PR OFFICE/OUTPT VISIT, EST, LEVL IV, 30-39 MIN: ICD-10-PCS | Mod: S$GLB,,, | Performed by: NURSE PRACTITIONER

## 2022-10-28 PROCEDURE — 3078F PR MOST RECENT DIASTOLIC BLOOD PRESSURE < 80 MM HG: ICD-10-PCS | Mod: CPTII,S$GLB,, | Performed by: NURSE PRACTITIONER

## 2022-10-28 PROCEDURE — 99214 OFFICE O/P EST MOD 30 MIN: CPT | Mod: S$GLB,,, | Performed by: NURSE PRACTITIONER

## 2022-10-28 PROCEDURE — 84443 ASSAY THYROID STIM HORMONE: CPT | Performed by: NURSE PRACTITIONER

## 2022-10-28 PROCEDURE — 3078F DIAST BP <80 MM HG: CPT | Mod: CPTII,S$GLB,, | Performed by: NURSE PRACTITIONER

## 2022-10-28 PROCEDURE — 1160F RVW MEDS BY RX/DR IN RCRD: CPT | Mod: CPTII,S$GLB,, | Performed by: NURSE PRACTITIONER

## 2022-10-28 PROCEDURE — 1159F PR MEDICATION LIST DOCUMENTED IN MEDICAL RECORD: ICD-10-PCS | Mod: CPTII,S$GLB,, | Performed by: NURSE PRACTITIONER

## 2022-10-28 PROCEDURE — 36415 COLL VENOUS BLD VENIPUNCTURE: CPT | Mod: S$GLB,,, | Performed by: NURSE PRACTITIONER

## 2022-10-28 PROCEDURE — 1159F MED LIST DOCD IN RCRD: CPT | Mod: CPTII,S$GLB,, | Performed by: NURSE PRACTITIONER

## 2022-10-28 PROCEDURE — 36415 PR COLLECTION VENOUS BLOOD,VENIPUNCTURE: ICD-10-PCS | Mod: S$GLB,,, | Performed by: NURSE PRACTITIONER

## 2022-10-28 PROCEDURE — 80053 COMPREHEN METABOLIC PANEL: CPT | Performed by: NURSE PRACTITIONER

## 2022-10-28 PROCEDURE — 3074F SYST BP LT 130 MM HG: CPT | Mod: CPTII,S$GLB,, | Performed by: NURSE PRACTITIONER

## 2022-10-28 PROCEDURE — 80061 LIPID PANEL: CPT | Performed by: NURSE PRACTITIONER

## 2022-10-28 PROCEDURE — 1160F PR REVIEW ALL MEDS BY PRESCRIBER/CLIN PHARMACIST DOCUMENTED: ICD-10-PCS | Mod: CPTII,S$GLB,, | Performed by: NURSE PRACTITIONER

## 2022-10-28 RX ORDER — LEVOTHYROXINE SODIUM 88 UG/1
88 TABLET ORAL DAILY
Qty: 90 TABLET | Refills: 2 | Status: SHIPPED | OUTPATIENT
Start: 2022-10-28 | End: 2023-05-08 | Stop reason: SDUPTHER

## 2022-10-28 RX ORDER — DILTIAZEM HYDROCHLORIDE 240 MG/1
240 CAPSULE, COATED, EXTENDED RELEASE ORAL DAILY
Qty: 90 CAPSULE | Refills: 2 | Status: SHIPPED | OUTPATIENT
Start: 2022-10-28 | End: 2023-05-08 | Stop reason: SDUPTHER

## 2022-10-28 NOTE — PROGRESS NOTES
Subjective:       Patient ID: Shira Gloria is a 62 y.o. female.    Chief Complaint: Follow-up    HPI here for follow up. States she is doing well. She has great BP control.     Evaluated by Rheumatology: positive for RA. They have decided to monitor for now. She was scheduled for MRI of the hands and wrists but was unable to tolerate the test due to the required body position caused shoulder pain.     She states also having some symptoms of carpal tunnel to her right wrist, this is not a constant issue. She does not want testing for this right now. Discussed that EMG would be the test she would need when ready.    She has no other concerns. Due for mammogram later this year. Due for labs. See ROS.    The following portion of the patients history was reviewed and updated as appropriate: allergies, current medications, past medical and surgical history. Past social history and problem list reviewed. Family PMH and Past social history reviewed. Tobacco, Illicit drug use reviewed.      Review of patient's allergies indicates:   Allergen Reactions    Cranberry Rash         Current Outpatient Medications:     diltiaZEM (CARDIZEM CD) 240 MG 24 hr capsule, TAKE 1 CAPSULE(240 MG) BY MOUTH EVERY DAY, Disp: 90 capsule, Rfl: 0    levothyroxine (SYNTHROID) 88 MCG tablet, Take 1 tablet (88 mcg total) by mouth once daily., Disp: 90 tablet, Rfl: 1    Past Medical History:   Diagnosis Date    HTN (hypertension)     Hypothyroidism     Morbid obesity with BMI of 40.0-44.9, adult        No past surgical history on file.    Social History     Socioeconomic History    Marital status: Significant Other   Tobacco Use    Smoking status: Never    Smokeless tobacco: Never   Substance and Sexual Activity    Alcohol use: Yes     Comment: social     Review of Systems   Constitutional:  Negative for fatigue and fever.   HENT:  Negative for congestion, postnasal drip, rhinorrhea and voice change.    Eyes:  Negative for visual disturbance.  "  Respiratory:  Negative for cough, chest tightness, shortness of breath and wheezing.    Cardiovascular:  Negative for chest pain, palpitations and leg swelling.   Gastrointestinal:  Negative for abdominal pain, blood in stool, constipation, diarrhea, nausea and vomiting.   Genitourinary:  Negative for difficulty urinating and dysuria.   Musculoskeletal:  Positive for arthralgias and myalgias. Negative for back pain and gait problem.        See HPI   Skin:  Negative for rash and wound.   Neurological:  Negative for dizziness, weakness and headaches.   Hematological:  Negative for adenopathy. Does not bruise/bleed easily.   Psychiatric/Behavioral:  Negative for decreased concentration, dysphoric mood and sleep disturbance. The patient is not nervous/anxious.      Objective:      /74   Pulse 64   Temp 98.4 °F (36.9 °C) (Temporal)   Resp 18   Ht 5' 1" (1.549 m)   Wt 95.5 kg (210 lb 8.6 oz)   SpO2 96%   BMI 39.78 kg/m²      Physical Exam  Vitals reviewed.   Constitutional:       General: She is not in acute distress.     Appearance: Normal appearance. She is well-developed. She is obese.   HENT:      Head: Normocephalic.      Mouth/Throat:      Mouth: Mucous membranes are moist.      Pharynx: Oropharynx is clear.   Eyes:      Conjunctiva/sclera: Conjunctivae normal.      Pupils: Pupils are equal, round, and reactive to light.   Neck:      Thyroid: Thyromegaly present.      Vascular: No carotid bruit or JVD.      Trachea: Trachea normal. No tracheal tenderness or tracheal deviation.   Cardiovascular:      Rate and Rhythm: Normal rate and regular rhythm.      Pulses: Normal pulses.           Carotid pulses are 2+ on the right side and 2+ on the left side.       Radial pulses are 2+ on the right side and 2+ on the left side.      Heart sounds: Normal heart sounds. No murmur heard.    No gallop.   Pulmonary:      Effort: Pulmonary effort is normal. No respiratory distress.      Breath sounds: Normal breath " sounds. No stridor. No wheezing, rhonchi or rales.   Abdominal:      General: Bowel sounds are normal.      Palpations: Abdomen is soft. There is no splenomegaly or mass.      Tenderness: There is no abdominal tenderness. There is no guarding or rebound.   Musculoskeletal:         General: Normal range of motion.      Cervical back: Full passive range of motion without pain, normal range of motion and neck supple. No edema.      Right lower leg: No edema.      Left lower leg: No edema.      Comments: Gait and coordination normal.  strong, equal. Upper and lower extremity strength normal.    Skin:     General: Skin is warm and dry.      Capillary Refill: Capillary refill takes less than 2 seconds.      Findings: No lesion or rash.   Neurological:      General: No focal deficit present.      Mental Status: She is alert and oriented to person, place, and time.      Gait: Gait normal.   Psychiatric:         Attention and Perception: Attention and perception normal.         Mood and Affect: Mood and affect normal.         Speech: Speech normal.         Behavior: Behavior normal.       Assessment:       1. Hypertension, essential    2. Hypothyroidism due to acquired atrophy of thyroid    3. JAYDEN on CPAP    4. Encounter for screening mammogram for malignant neoplasm of breast    5. Enlarged thyroid    6. Rheumatoid arthritis involving both hands with positive rheumatoid factor        Plan:       Hypertension, essential: good BP control on current medication.  -     Lipid Panel  -     Comprehensive Metabolic Panel    Hypothyroidism due to acquired atrophy of thyroid: stable. Will adjust medication dose if needed based on lab results  -     TSH    JAYDEN on CPAP: compliant    Encounter for screening mammogram for malignant neoplasm of breast  -     Mammo Digital Screening Bilat w/ Nasir; Future; Expected date: 10/28/2022    Enlarged thyroid  -     US Soft Tissue Head Neck Thyroid; Future; Expected date:  10/28/2022    Rheumatoid arthritis involving both hands with positive rheumatoid factor: continue to follow with Rheumatology.    Other orders  -     diltiaZEM (CARDIZEM CD) 240 MG 24 hr capsule; Take 1 capsule (240 mg total) by mouth once daily.  Dispense: 90 capsule; Refill: 2  -     levothyroxine (SYNTHROID) 88 MCG tablet; Take 1 tablet (88 mcg total) by mouth once daily.  Dispense: 90 tablet; Refill: 2     Continue current medication  Take medications only as prescribed  Healthy diet, exercise  Adequate rest  Adequate hydration  Avoid allergens  Avoid excessive caffeine      Follow up 6 months.

## 2022-10-29 LAB
ALBUMIN SERPL BCP-MCNC: 3.9 G/DL (ref 3.5–5.2)
ALP SERPL-CCNC: 191 U/L (ref 55–135)
ALT SERPL W/O P-5'-P-CCNC: 28 U/L (ref 10–44)
ANION GAP SERPL CALC-SCNC: 11 MMOL/L (ref 8–16)
AST SERPL-CCNC: 28 U/L (ref 10–40)
BILIRUB SERPL-MCNC: 0.7 MG/DL (ref 0.1–1)
BUN SERPL-MCNC: 13 MG/DL (ref 6–20)
CALCIUM SERPL-MCNC: 9.2 MG/DL (ref 8.7–10.5)
CHLORIDE SERPL-SCNC: 108 MMOL/L (ref 95–110)
CHOLEST SERPL-MCNC: 229 MG/DL (ref 120–199)
CHOLEST/HDLC SERPL: 4.8 {RATIO} (ref 2–5)
CO2 SERPL-SCNC: 23 MMOL/L (ref 23–29)
CREAT SERPL-MCNC: 0.8 MG/DL (ref 0.5–1.4)
EST. GFR  (NO RACE VARIABLE): >60 ML/MIN/1.73 M^2
GLUCOSE SERPL-MCNC: 90 MG/DL (ref 70–110)
HDLC SERPL-MCNC: 48 MG/DL (ref 40–75)
HDLC SERPL: 21 % (ref 20–50)
LDLC SERPL CALC-MCNC: 151.2 MG/DL (ref 63–159)
NONHDLC SERPL-MCNC: 181 MG/DL
POTASSIUM SERPL-SCNC: 4.1 MMOL/L (ref 3.5–5.1)
PROT SERPL-MCNC: 6.9 G/DL (ref 6–8.4)
SODIUM SERPL-SCNC: 142 MMOL/L (ref 136–145)
TRIGL SERPL-MCNC: 149 MG/DL (ref 30–150)
TSH SERPL DL<=0.005 MIU/L-ACNC: 0.69 UIU/ML (ref 0.4–4)

## 2022-11-04 ENCOUNTER — PATIENT MESSAGE (OUTPATIENT)
Dept: FAMILY MEDICINE | Facility: CLINIC | Age: 62
End: 2022-11-04
Payer: COMMERCIAL

## 2022-11-18 ENCOUNTER — HOSPITAL ENCOUNTER (OUTPATIENT)
Dept: RADIOLOGY | Facility: HOSPITAL | Age: 62
Discharge: HOME | End: 2022-11-18
Attending: NURSE PRACTITIONER
Payer: COMMERCIAL

## 2022-11-18 DIAGNOSIS — E04.9 ENLARGED THYROID: ICD-10-CM

## 2022-11-18 PROCEDURE — 76536 US SOFT TISSUE HEAD NECK THYROID: ICD-10-PCS | Mod: 26,,, | Performed by: RADIOLOGY

## 2022-11-18 PROCEDURE — 76536 US EXAM OF HEAD AND NECK: CPT | Mod: 26,,, | Performed by: RADIOLOGY

## 2022-11-18 PROCEDURE — 76536 US EXAM OF HEAD AND NECK: CPT | Mod: TC,PO

## 2022-11-21 ENCOUNTER — PATIENT MESSAGE (OUTPATIENT)
Dept: FAMILY MEDICINE | Facility: CLINIC | Age: 62
End: 2022-11-21
Payer: COMMERCIAL

## 2022-11-22 ENCOUNTER — HOSPITAL ENCOUNTER (OUTPATIENT)
Dept: RADIOLOGY | Facility: HOSPITAL | Age: 62
Discharge: HOME | End: 2022-11-22
Attending: UROLOGY
Payer: COMMERCIAL

## 2022-11-22 DIAGNOSIS — N20.0 NEPHROLITHIASIS: ICD-10-CM

## 2022-11-22 PROCEDURE — 74018 RADEX ABDOMEN 1 VIEW: CPT | Mod: 26,,, | Performed by: RADIOLOGY

## 2022-11-22 PROCEDURE — 74018 XR ABDOMEN AP 1 VIEW: ICD-10-PCS | Mod: 26,,, | Performed by: RADIOLOGY

## 2022-11-22 PROCEDURE — 74018 RADEX ABDOMEN 1 VIEW: CPT | Mod: TC,FY,PO

## 2022-12-27 ENCOUNTER — PATIENT MESSAGE (OUTPATIENT)
Dept: FAMILY MEDICINE | Facility: CLINIC | Age: 62
End: 2022-12-27
Payer: COMMERCIAL

## 2023-02-03 ENCOUNTER — OFFICE VISIT (OUTPATIENT)
Dept: RHEUMATOLOGY | Facility: CLINIC | Age: 63
End: 2023-02-03
Payer: COMMERCIAL

## 2023-02-03 VITALS
SYSTOLIC BLOOD PRESSURE: 122 MMHG | BODY MASS INDEX: 40.41 KG/M2 | DIASTOLIC BLOOD PRESSURE: 76 MMHG | HEART RATE: 60 BPM | WEIGHT: 213.88 LBS

## 2023-02-03 DIAGNOSIS — R20.0 NUMBNESS AND TINGLING OF RIGHT HAND: Primary | ICD-10-CM

## 2023-02-03 DIAGNOSIS — M79.641 BILATERAL HAND PAIN: Primary | ICD-10-CM

## 2023-02-03 DIAGNOSIS — R20.2 NUMBNESS AND TINGLING OF RIGHT HAND: Primary | ICD-10-CM

## 2023-02-03 DIAGNOSIS — R76.8 POSITIVE ANTI-CCP TEST: ICD-10-CM

## 2023-02-03 DIAGNOSIS — M79.642 BILATERAL HAND PAIN: Primary | ICD-10-CM

## 2023-02-03 PROCEDURE — 3074F SYST BP LT 130 MM HG: CPT | Mod: CPTII,S$GLB,, | Performed by: INTERNAL MEDICINE

## 2023-02-03 PROCEDURE — 3008F BODY MASS INDEX DOCD: CPT | Mod: CPTII,S$GLB,, | Performed by: INTERNAL MEDICINE

## 2023-02-03 PROCEDURE — 99999 PR PBB SHADOW E&M-EST. PATIENT-LVL III: CPT | Mod: PBBFAC,,, | Performed by: INTERNAL MEDICINE

## 2023-02-03 PROCEDURE — 1159F PR MEDICATION LIST DOCUMENTED IN MEDICAL RECORD: ICD-10-PCS | Mod: CPTII,S$GLB,, | Performed by: INTERNAL MEDICINE

## 2023-02-03 PROCEDURE — 3078F DIAST BP <80 MM HG: CPT | Mod: CPTII,S$GLB,, | Performed by: INTERNAL MEDICINE

## 2023-02-03 PROCEDURE — 3078F PR MOST RECENT DIASTOLIC BLOOD PRESSURE < 80 MM HG: ICD-10-PCS | Mod: CPTII,S$GLB,, | Performed by: INTERNAL MEDICINE

## 2023-02-03 PROCEDURE — 99999 PR PBB SHADOW E&M-EST. PATIENT-LVL III: ICD-10-PCS | Mod: PBBFAC,,, | Performed by: INTERNAL MEDICINE

## 2023-02-03 PROCEDURE — 99214 OFFICE O/P EST MOD 30 MIN: CPT | Mod: S$GLB,,, | Performed by: INTERNAL MEDICINE

## 2023-02-03 PROCEDURE — 99214 PR OFFICE/OUTPT VISIT, EST, LEVL IV, 30-39 MIN: ICD-10-PCS | Mod: S$GLB,,, | Performed by: INTERNAL MEDICINE

## 2023-02-03 PROCEDURE — 3008F PR BODY MASS INDEX (BMI) DOCUMENTED: ICD-10-PCS | Mod: CPTII,S$GLB,, | Performed by: INTERNAL MEDICINE

## 2023-02-03 PROCEDURE — 3074F PR MOST RECENT SYSTOLIC BLOOD PRESSURE < 130 MM HG: ICD-10-PCS | Mod: CPTII,S$GLB,, | Performed by: INTERNAL MEDICINE

## 2023-02-03 PROCEDURE — 1159F MED LIST DOCD IN RCRD: CPT | Mod: CPTII,S$GLB,, | Performed by: INTERNAL MEDICINE

## 2023-02-03 NOTE — PROGRESS NOTES
Subjective:       Patient ID: Shira Gloria is a 61 y.o. female.    Chief Complaint: Disease Management    HPI  61 year old F with PMH of hypothyroidism, HTN, JAYDEN, nephrolithiasis here for evaluation.She has been having pain for 18 months.  She has pain in shoulders, wrists, hands, and hips.   Pain level can be as high as 8/10. She takes advil 2 pills daily.  She reports ice is most helpful. She used to take meloxicam and it did not help her pain. Tramadol does not help her pain.  Denies any hair loss, oral ulcers, fevers, or raynauds.    Denies taking steroids.  Family hx: negative for RA  Grandmother:SLE     Interval history: Reports she had flare 2 weeks ago.She had swelling of right wrist and hand.  She has pain in hips. She is flaring every 2 months.  When she flares, she does ice and ibuprofen.She is stiff in morning for an hour.          Review of Systems   Constitutional: Negative for activity change, appetite change, chills, diaphoresis, fatigue and fever.   HENT: Negative for congestion, ear discharge, ear pain, facial swelling, mouth sores, sinus pressure, sneezing, sore throat, tinnitus and trouble swallowing.    Eyes: Negative for photophobia, pain, discharge, redness, itching and visual disturbance.   Respiratory: Negative for apnea, cough, chest tightness, shortness of breath, wheezing and stridor.    Cardiovascular: Negative for chest pain and leg swelling.   Gastrointestinal: Negative for abdominal distention, abdominal pain, anal bleeding, blood in stool, constipation, diarrhea and nausea.   Endocrine: Negative for cold intolerance and heat intolerance.   Genitourinary: Negative for difficulty urinating, dysuria and genital sores.   Musculoskeletal: Positive for arthralgias and joint swelling. Negative for back pain, gait problem, myalgias, neck pain and neck stiffness.   Skin: Negative for color change, pallor, rash and wound.   Neurological: Negative for dizziness, seizures, light-headedness,  numbness and headaches.   Hematological: Negative for adenopathy. Does not bruise/bleed easily.   Psychiatric/Behavioral: Negative for sleep disturbance. The patient is not nervous/anxious.            Objective:     Physical Exam   Constitutional: She is oriented to person, place, and time.   HENT:   Head: Normocephalic and atraumatic.   Right Ear: External ear normal.   Left Ear: External ear normal.   Nose: Nose normal.   Mouth/Throat: Oropharynx is clear and moist. No oropharyngeal exudate.   Eyes: Pupils are equal, round, and reactive to light. Conjunctivae are normal. Right eye exhibits no discharge. Left eye exhibits no discharge. No scleral icterus.   Neck: No JVD present. No thyromegaly present.   Cardiovascular: Normal rate, regular rhythm and normal heart sounds. Exam reveals no gallop and no friction rub.   No murmur heard.  Pulmonary/Chest: Effort normal and breath sounds normal. No respiratory distress. She has no wheezes. She has no rales. She exhibits no tenderness.   Abdominal: Soft. Bowel sounds are normal. She exhibits no distension and no mass. There is no abdominal tenderness. There is no rebound and no guarding.   Musculoskeletal:         General: No swelling, tenderness, deformity or signs of injury.      Cervical back: Neck supple.   Lymphadenopathy:     She has no cervical adenopathy.   Neurological: She is alert and oriented to person, place, and time. No cranial nerve deficit. Gait normal. Coordination normal.   Skin: Skin is dry. No rash noted. No erythema. No pallor.   Psychiatric: Affect and judgment normal.        No data to display     Assessment:      62  year old F with PMH of hypothyroidism, HTN, JAYDEN, nephrolithiasis here for evaluation of polyarthralgias.  She has +RF and +CCP but I detected no synovitis on initial visit.  She was unable to tolerate MRI so we can consider open MRI.   She complains of numbness in right hand so will set her up with ortho.  1. Arthralgia of both hands             Plan:   Labs  Ortho consult  rtc in 6 months      30 * minutes of total time spent on the encounter, which includes face to face time and non-face to face time preparing to see the patient (eg, review of tests), Obtaining and/or reviewing separately obtained history, Documenting clinical information in the electronic or other health record, Independently interpreting results (not separately reported) and communicating results to the patient/family/caregiver, or Care coordination (not separately reported).

## 2023-02-05 ENCOUNTER — PATIENT MESSAGE (OUTPATIENT)
Dept: RHEUMATOLOGY | Facility: CLINIC | Age: 63
End: 2023-02-05
Payer: COMMERCIAL

## 2023-03-27 ENCOUNTER — PATIENT MESSAGE (OUTPATIENT)
Dept: RHEUMATOLOGY | Facility: CLINIC | Age: 63
End: 2023-03-27
Payer: COMMERCIAL

## 2023-03-27 ENCOUNTER — PATIENT MESSAGE (OUTPATIENT)
Dept: FAMILY MEDICINE | Facility: CLINIC | Age: 63
End: 2023-03-27
Payer: COMMERCIAL

## 2023-03-27 DIAGNOSIS — R74.8 ELEVATED ALKALINE PHOSPHATASE LEVEL: Primary | ICD-10-CM

## 2023-03-27 DIAGNOSIS — Z00.00 LABORATORY EXAM ORDERED AS PART OF ROUTINE GENERAL MEDICAL EXAMINATION: ICD-10-CM

## 2023-03-27 DIAGNOSIS — E03.4 HYPOTHYROIDISM DUE TO ACQUIRED ATROPHY OF THYROID: ICD-10-CM

## 2023-03-28 ENCOUNTER — PATIENT MESSAGE (OUTPATIENT)
Dept: FAMILY MEDICINE | Facility: CLINIC | Age: 63
End: 2023-03-28
Payer: COMMERCIAL

## 2023-04-03 ENCOUNTER — LAB VISIT (OUTPATIENT)
Dept: LAB | Facility: HOSPITAL | Age: 63
End: 2023-04-03
Attending: INTERNAL MEDICINE
Payer: COMMERCIAL

## 2023-04-03 DIAGNOSIS — E03.4 HYPOTHYROIDISM DUE TO ACQUIRED ATROPHY OF THYROID: ICD-10-CM

## 2023-04-03 DIAGNOSIS — Z00.00 LABORATORY EXAM ORDERED AS PART OF ROUTINE GENERAL MEDICAL EXAMINATION: ICD-10-CM

## 2023-04-03 DIAGNOSIS — R20.0 NUMBNESS AND TINGLING OF RIGHT HAND: ICD-10-CM

## 2023-04-03 DIAGNOSIS — R74.8 ELEVATED ALKALINE PHOSPHATASE LEVEL: ICD-10-CM

## 2023-04-03 DIAGNOSIS — R20.2 NUMBNESS AND TINGLING OF RIGHT HAND: ICD-10-CM

## 2023-04-03 LAB
ALBUMIN SERPL BCP-MCNC: 3.6 G/DL (ref 3.5–5.2)
ALP SERPL-CCNC: 172 U/L (ref 55–135)
ALT SERPL W/O P-5'-P-CCNC: 28 U/L (ref 10–44)
ANION GAP SERPL CALC-SCNC: 10 MMOL/L (ref 8–16)
AST SERPL-CCNC: 28 U/L (ref 10–40)
BILIRUB SERPL-MCNC: 0.8 MG/DL (ref 0.1–1)
BUN SERPL-MCNC: 14 MG/DL (ref 6–20)
CALCIUM SERPL-MCNC: 9.1 MG/DL (ref 8.7–10.5)
CHLORIDE SERPL-SCNC: 107 MMOL/L (ref 95–110)
CO2 SERPL-SCNC: 23 MMOL/L (ref 23–29)
CREAT SERPL-MCNC: 0.9 MG/DL (ref 0.5–1.4)
CRP SERPL-MCNC: 3.7 MG/L (ref 0–8.2)
ERYTHROCYTE [SEDIMENTATION RATE] IN BLOOD BY PHOTOMETRIC METHOD: 16 MM/HR (ref 0–36)
EST. GFR  (NO RACE VARIABLE): >60 ML/MIN/1.73 M^2
ESTIMATED AVG GLUCOSE: 105 MG/DL (ref 68–131)
GLUCOSE SERPL-MCNC: 110 MG/DL (ref 70–110)
HBA1C MFR BLD: 5.3 % (ref 4–5.6)
POTASSIUM SERPL-SCNC: 3.6 MMOL/L (ref 3.5–5.1)
PROT SERPL-MCNC: 6.9 G/DL (ref 6–8.4)
SODIUM SERPL-SCNC: 140 MMOL/L (ref 136–145)
TSH SERPL DL<=0.005 MIU/L-ACNC: 1.17 UIU/ML (ref 0.4–4)

## 2023-04-03 PROCEDURE — 80053 COMPREHEN METABOLIC PANEL: CPT | Performed by: NURSE PRACTITIONER

## 2023-04-03 PROCEDURE — 86140 C-REACTIVE PROTEIN: CPT | Performed by: INTERNAL MEDICINE

## 2023-04-03 PROCEDURE — 83036 HEMOGLOBIN GLYCOSYLATED A1C: CPT | Performed by: NURSE PRACTITIONER

## 2023-04-03 PROCEDURE — 85652 RBC SED RATE AUTOMATED: CPT | Performed by: INTERNAL MEDICINE

## 2023-04-03 PROCEDURE — 84443 ASSAY THYROID STIM HORMONE: CPT | Performed by: NURSE PRACTITIONER

## 2023-04-03 PROCEDURE — 36415 COLL VENOUS BLD VENIPUNCTURE: CPT | Mod: PO | Performed by: INTERNAL MEDICINE

## 2023-04-05 ENCOUNTER — PATIENT MESSAGE (OUTPATIENT)
Dept: FAMILY MEDICINE | Facility: CLINIC | Age: 63
End: 2023-04-05
Payer: COMMERCIAL

## 2023-04-24 ENCOUNTER — TELEPHONE (OUTPATIENT)
Dept: FAMILY MEDICINE | Facility: CLINIC | Age: 63
End: 2023-04-24
Payer: COMMERCIAL

## 2023-04-24 NOTE — TELEPHONE ENCOUNTER
----- Message from Anahi James sent at 4/24/2023  8:21 AM CDT -----  Contact: Pt  Type: Needs Medical Advice    Who Called:Pt  Best Call Back Number:[phone]    Additional Information Requesting a call back regarding Pt was calling to speak with nurse pt did not specify the issue pt stated to please call when available Thank you  Please Advise-Thank you

## 2023-04-24 NOTE — TELEPHONE ENCOUNTER
Spoke w/ pt , pt accidentally  took her daily medication twice this am . 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 Pt states that she feels fine so far . Pt advised on importance of staying hydrated , no driving, no operating heavy equipment , get rest . Pt advised to not take any medication this evening . Pt advised to monitor for dizziness, low heart rate . --lp

## 2023-05-08 ENCOUNTER — OFFICE VISIT (OUTPATIENT)
Dept: FAMILY MEDICINE | Facility: CLINIC | Age: 63
End: 2023-05-08
Payer: COMMERCIAL

## 2023-05-08 VITALS
HEIGHT: 61 IN | BODY MASS INDEX: 38.67 KG/M2 | WEIGHT: 204.81 LBS | DIASTOLIC BLOOD PRESSURE: 72 MMHG | SYSTOLIC BLOOD PRESSURE: 116 MMHG | TEMPERATURE: 98 F | HEART RATE: 60 BPM | OXYGEN SATURATION: 99 %

## 2023-05-08 DIAGNOSIS — Z00.00 ANNUAL PHYSICAL EXAM: Primary | ICD-10-CM

## 2023-05-08 DIAGNOSIS — E66.01 CLASS 2 SEVERE OBESITY DUE TO EXCESS CALORIES WITH SERIOUS COMORBIDITY AND BODY MASS INDEX (BMI) OF 38.0 TO 38.9 IN ADULT: ICD-10-CM

## 2023-05-08 DIAGNOSIS — S03.40XA SPRAIN OF TEMPOROMANDIBULAR JOINT, INITIAL ENCOUNTER: ICD-10-CM

## 2023-05-08 DIAGNOSIS — E03.4 HYPOTHYROIDISM DUE TO ACQUIRED ATROPHY OF THYROID: ICD-10-CM

## 2023-05-08 DIAGNOSIS — M05.742 RHEUMATOID ARTHRITIS INVOLVING BOTH HANDS WITH POSITIVE RHEUMATOID FACTOR: ICD-10-CM

## 2023-05-08 DIAGNOSIS — I10 HYPERTENSION, ESSENTIAL: ICD-10-CM

## 2023-05-08 DIAGNOSIS — M05.741 RHEUMATOID ARTHRITIS INVOLVING BOTH HANDS WITH POSITIVE RHEUMATOID FACTOR: ICD-10-CM

## 2023-05-08 DIAGNOSIS — G47.33 OSA ON CPAP: ICD-10-CM

## 2023-05-08 DIAGNOSIS — G56.03 BILATERAL CARPAL TUNNEL SYNDROME: ICD-10-CM

## 2023-05-08 PROCEDURE — 1160F PR REVIEW ALL MEDS BY PRESCRIBER/CLIN PHARMACIST DOCUMENTED: ICD-10-PCS | Mod: CPTII,S$GLB,, | Performed by: NURSE PRACTITIONER

## 2023-05-08 PROCEDURE — 3008F BODY MASS INDEX DOCD: CPT | Mod: CPTII,S$GLB,, | Performed by: NURSE PRACTITIONER

## 2023-05-08 PROCEDURE — 1159F MED LIST DOCD IN RCRD: CPT | Mod: CPTII,S$GLB,, | Performed by: NURSE PRACTITIONER

## 2023-05-08 PROCEDURE — 3078F PR MOST RECENT DIASTOLIC BLOOD PRESSURE < 80 MM HG: ICD-10-PCS | Mod: CPTII,S$GLB,, | Performed by: NURSE PRACTITIONER

## 2023-05-08 PROCEDURE — 1159F PR MEDICATION LIST DOCUMENTED IN MEDICAL RECORD: ICD-10-PCS | Mod: CPTII,S$GLB,, | Performed by: NURSE PRACTITIONER

## 2023-05-08 PROCEDURE — 3078F DIAST BP <80 MM HG: CPT | Mod: CPTII,S$GLB,, | Performed by: NURSE PRACTITIONER

## 2023-05-08 PROCEDURE — 1160F RVW MEDS BY RX/DR IN RCRD: CPT | Mod: CPTII,S$GLB,, | Performed by: NURSE PRACTITIONER

## 2023-05-08 PROCEDURE — 99396 PR PREVENTIVE VISIT,EST,40-64: ICD-10-PCS | Mod: S$GLB,,, | Performed by: NURSE PRACTITIONER

## 2023-05-08 PROCEDURE — 3044F PR MOST RECENT HEMOGLOBIN A1C LEVEL <7.0%: ICD-10-PCS | Mod: CPTII,S$GLB,, | Performed by: NURSE PRACTITIONER

## 2023-05-08 PROCEDURE — 3074F SYST BP LT 130 MM HG: CPT | Mod: CPTII,S$GLB,, | Performed by: NURSE PRACTITIONER

## 2023-05-08 PROCEDURE — 99396 PREV VISIT EST AGE 40-64: CPT | Mod: S$GLB,,, | Performed by: NURSE PRACTITIONER

## 2023-05-08 PROCEDURE — 3074F PR MOST RECENT SYSTOLIC BLOOD PRESSURE < 130 MM HG: ICD-10-PCS | Mod: CPTII,S$GLB,, | Performed by: NURSE PRACTITIONER

## 2023-05-08 PROCEDURE — 3044F HG A1C LEVEL LT 7.0%: CPT | Mod: CPTII,S$GLB,, | Performed by: NURSE PRACTITIONER

## 2023-05-08 PROCEDURE — 3008F PR BODY MASS INDEX (BMI) DOCUMENTED: ICD-10-PCS | Mod: CPTII,S$GLB,, | Performed by: NURSE PRACTITIONER

## 2023-05-08 RX ORDER — DILTIAZEM HYDROCHLORIDE 240 MG/1
240 CAPSULE, COATED, EXTENDED RELEASE ORAL DAILY
Qty: 90 CAPSULE | Refills: 2 | Status: SHIPPED | OUTPATIENT
Start: 2023-05-08 | End: 2023-11-04 | Stop reason: SDUPTHER

## 2023-05-08 RX ORDER — LEVOTHYROXINE SODIUM 88 UG/1
88 TABLET ORAL DAILY
Qty: 90 TABLET | Refills: 2 | Status: SHIPPED | OUTPATIENT
Start: 2023-05-08 | End: 2023-11-04 | Stop reason: SDUPTHER

## 2023-05-08 NOTE — PROGRESS NOTES
Subjective:       Patient ID: Shira Gloria is a 62 y.o. female.    Chief Complaint: annual exam     Follow-up  Associated symptoms include arthralgias and joint swelling. Pertinent negatives include no abdominal pain, chest pain, coughing, fatigue, fever, headaches, nausea, rash, vomiting or weakness.     Here for annual exam. Recent labs reviewed.     TMJ left jaw. Saw dentist. Advised to get mouth guard. Mild improvement. Just on the left side.     Saw Orthopedics for carpal tunnel. Wearing wrist splints when needed. EMG: mild to mod carpal tunnel. No treatment recommended at this time. Ibuprofen as needed.     Rheumatology, recent labs in normal range. Questioned taking Tumuric.     JAYDEN: complaint with Cpap.     See ROS.    The following portion of the patients history was reviewed and updated as appropriate: allergies, current medications, past medical and surgical history. Past social history and problem list reviewed. Family PMH and Past social history reviewed. Tobacco, Illicit drug use reviewed.      Review of patient's allergies indicates:   Allergen Reactions    Cranberry Rash         Current Outpatient Medications:     diltiaZEM (CARDIZEM CD) 240 MG 24 hr capsule, Take 1 capsule (240 mg total) by mouth once daily., Disp: 90 capsule, Rfl: 2    levothyroxine (SYNTHROID) 88 MCG tablet, Take 1 tablet (88 mcg total) by mouth once daily., Disp: 90 tablet, Rfl: 2    Past Medical History:   Diagnosis Date    HTN (hypertension)     Hypothyroidism     Morbid obesity with BMI of 40.0-44.9, adult        History reviewed. No pertinent surgical history.    Social History     Socioeconomic History    Marital status: Significant Other   Tobacco Use    Smoking status: Never     Passive exposure: Never    Smokeless tobacco: Never   Substance and Sexual Activity    Alcohol use: Yes     Comment: social     Review of Systems   Constitutional:  Negative for fatigue and fever.   HENT:          Left jaw discomfort.    Eyes:   "Negative for visual disturbance.   Respiratory:  Negative for cough, chest tightness, shortness of breath and wheezing.    Cardiovascular:  Negative for chest pain, palpitations and leg swelling.   Gastrointestinal:  Negative for abdominal pain, blood in stool, constipation, diarrhea, nausea and vomiting.   Genitourinary:  Negative for difficulty urinating and dysuria.   Musculoskeletal:  Positive for arthralgias and joint swelling. Negative for back pain and gait problem.        See HPI   Skin:  Negative for rash and wound.   Neurological:  Negative for dizziness, weakness and headaches.   Hematological:  Negative for adenopathy. Does not bruise/bleed easily.   Psychiatric/Behavioral:  Negative for decreased concentration, dysphoric mood and sleep disturbance. The patient is not nervous/anxious.      Objective:      /72 (BP Location: Left arm, Patient Position: Sitting)   Pulse 60   Temp 97.7 °F (36.5 °C)   Ht 5' 1" (1.549 m)   Wt 92.9 kg (204 lb 12.9 oz)   SpO2 99%   BMI 38.70 kg/m²      Physical Exam  Constitutional:       Appearance: Normal appearance. She is obese.   HENT:      Head: Normocephalic.     Neck:      Thyroid: No thyromegaly.      Vascular: No carotid bruit.   Cardiovascular:      Rate and Rhythm: Normal rate and regular rhythm.      Pulses: Normal pulses.      Heart sounds: Normal heart sounds.   Pulmonary:      Effort: Pulmonary effort is normal.      Breath sounds: Normal breath sounds.   Abdominal:      General: Bowel sounds are normal.      Tenderness: There is no abdominal tenderness.   Musculoskeletal:      Cervical back: Normal range of motion.      Right lower leg: No edema.      Left lower leg: No edema.      Comments: Gait normal.  strong, equal.    Skin:     General: Skin is warm and dry.      Capillary Refill: Capillary refill takes less than 2 seconds.   Neurological:      General: No focal deficit present.      Mental Status: She is alert.   Psychiatric:         " Attention and Perception: Attention and perception normal.         Mood and Affect: Mood and affect normal.         Speech: Speech normal.         Behavior: Behavior normal.       Assessment:       1. Annual physical exam    2. Hypertension, essential    3. Hypothyroidism due to acquired atrophy of thyroid    4. JAYDEN on CPAP    5. Rheumatoid arthritis involving both hands with positive rheumatoid factor    6. Class 2 severe obesity due to excess calories with serious comorbidity and body mass index (BMI) of 38.0 to 38.9 in adult    7. Bilateral carpal tunnel syndrome    8. Sprain of temporomandibular joint, initial encounter        Plan:       Annual physical exam    Hypertension, essential: good BP control on current medications.    Hypothyroidism due to acquired atrophy of thyroid: Good Control. Continue current medications.     Lab Results   Component Value Date    TSH 1.170 04/03/2023        JAYDEN on CPAP: compliant with Cpap use.    Rheumatoid arthritis involving both hands with positive rheumatoid factor: followed by Rheumatology. Stable.     Class 2 severe obesity due to excess calories with serious comorbidity and body mass index (BMI) of 38.0 to 38.9 in adult: Weight loss encouraged. Follow low fat, low carb diet. Portion control, exercise.      Bilateral carpal tunnel syndrome: follow up with Orthopedics if symptoms worsen. Wear wrist splints during flare ups.     Sprain of temporomandibular joint, initial encounter: get mouth guard as recommended by Dentist. Avoid sticky, hard to chew foods. Avoid opening mouth wide. Ice.  If not improving follow up    Other orders  -     diltiaZEM (CARDIZEM CD) 240 MG 24 hr capsule; Take 1 capsule (240 mg total) by mouth once daily.  Dispense: 90 capsule; Refill: 2  -     levothyroxine (SYNTHROID) 88 MCG tablet; Take 1 tablet (88 mcg total) by mouth once daily.  Dispense: 90 tablet; Refill: 2       Continue current medication  Take medications only as prescribed  Healthy  diet, exercise  Adequate rest  Adequate hydration  Avoid allergens  Avoid excessive caffeine      Follow up 6 months.

## 2023-06-19 ENCOUNTER — HOSPITAL ENCOUNTER (OUTPATIENT)
Dept: RADIOLOGY | Facility: HOSPITAL | Age: 63
Discharge: HOME | End: 2023-06-19
Attending: ORTHOPAEDIC SURGERY
Payer: COMMERCIAL

## 2023-06-19 DIAGNOSIS — M54.12 RADICULOPATHY, CERVICAL REGION: ICD-10-CM

## 2023-06-19 PROCEDURE — 72141 MRI CERVICAL SPINE WITHOUT CONTRAST: ICD-10-PCS | Mod: 26,,, | Performed by: RADIOLOGY

## 2023-06-19 PROCEDURE — 72141 MRI NECK SPINE W/O DYE: CPT | Mod: TC,PO

## 2023-06-19 PROCEDURE — 72050 X-RAY EXAM NECK SPINE 4/5VWS: CPT | Mod: 26,,, | Performed by: RADIOLOGY

## 2023-06-19 PROCEDURE — 72050 XR CERVICAL SPINE AP LAT WITH FLEX EXTEN: ICD-10-PCS | Mod: 26,,, | Performed by: RADIOLOGY

## 2023-06-19 PROCEDURE — 72050 X-RAY EXAM NECK SPINE 4/5VWS: CPT | Mod: TC,FY,PO

## 2023-06-19 PROCEDURE — 72141 MRI NECK SPINE W/O DYE: CPT | Mod: 26,,, | Performed by: RADIOLOGY

## 2023-07-06 ENCOUNTER — PATIENT MESSAGE (OUTPATIENT)
Dept: RADIOLOGY | Facility: HOSPITAL | Age: 63
End: 2023-07-06
Payer: COMMERCIAL

## 2023-08-29 ENCOUNTER — PATIENT MESSAGE (OUTPATIENT)
Dept: FAMILY MEDICINE | Facility: CLINIC | Age: 63
End: 2023-08-29
Payer: COMMERCIAL

## 2023-11-06 RX ORDER — LEVOTHYROXINE SODIUM 88 UG/1
88 TABLET ORAL DAILY
Qty: 90 TABLET | Refills: 1 | Status: SHIPPED | OUTPATIENT
Start: 2023-11-06 | End: 2023-11-21 | Stop reason: SDUPTHER

## 2023-11-06 RX ORDER — DILTIAZEM HYDROCHLORIDE 240 MG/1
240 CAPSULE, COATED, EXTENDED RELEASE ORAL DAILY
Qty: 90 CAPSULE | Refills: 1 | Status: SHIPPED | OUTPATIENT
Start: 2023-11-06 | End: 2023-11-21 | Stop reason: SDUPTHER

## 2023-11-21 ENCOUNTER — OFFICE VISIT (OUTPATIENT)
Dept: FAMILY MEDICINE | Facility: CLINIC | Age: 63
End: 2023-11-21
Payer: COMMERCIAL

## 2023-11-21 VITALS
DIASTOLIC BLOOD PRESSURE: 79 MMHG | HEART RATE: 61 BPM | TEMPERATURE: 98 F | SYSTOLIC BLOOD PRESSURE: 110 MMHG | OXYGEN SATURATION: 98 % | WEIGHT: 207.88 LBS | HEIGHT: 61 IN | BODY MASS INDEX: 39.25 KG/M2

## 2023-11-21 DIAGNOSIS — M05.742 RHEUMATOID ARTHRITIS INVOLVING BOTH HANDS WITH POSITIVE RHEUMATOID FACTOR: ICD-10-CM

## 2023-11-21 DIAGNOSIS — Z12.31 ENCOUNTER FOR SCREENING MAMMOGRAM FOR MALIGNANT NEOPLASM OF BREAST: ICD-10-CM

## 2023-11-21 DIAGNOSIS — M05.741 RHEUMATOID ARTHRITIS INVOLVING BOTH HANDS WITH POSITIVE RHEUMATOID FACTOR: ICD-10-CM

## 2023-11-21 DIAGNOSIS — E03.4 HYPOTHYROIDISM DUE TO ACQUIRED ATROPHY OF THYROID: Primary | ICD-10-CM

## 2023-11-21 DIAGNOSIS — E66.9 CLASS 2 OBESITY WITHOUT SERIOUS COMORBIDITY WITH BODY MASS INDEX (BMI) OF 39.0 TO 39.9 IN ADULT, UNSPECIFIED OBESITY TYPE: ICD-10-CM

## 2023-11-21 DIAGNOSIS — G47.33 OSA ON CPAP: ICD-10-CM

## 2023-11-21 DIAGNOSIS — M46.1 SACROILIITIS, NOT ELSEWHERE CLASSIFIED: ICD-10-CM

## 2023-11-21 DIAGNOSIS — Z12.11 COLON CANCER SCREENING: ICD-10-CM

## 2023-11-21 DIAGNOSIS — I10 HYPERTENSION, ESSENTIAL: ICD-10-CM

## 2023-11-21 PROCEDURE — 99214 PR OFFICE/OUTPT VISIT, EST, LEVL IV, 30-39 MIN: ICD-10-PCS | Mod: S$GLB,,, | Performed by: NURSE PRACTITIONER

## 2023-11-21 PROCEDURE — 3044F HG A1C LEVEL LT 7.0%: CPT | Mod: CPTII,S$GLB,, | Performed by: NURSE PRACTITIONER

## 2023-11-21 PROCEDURE — 1159F MED LIST DOCD IN RCRD: CPT | Mod: CPTII,S$GLB,, | Performed by: NURSE PRACTITIONER

## 2023-11-21 PROCEDURE — 3078F DIAST BP <80 MM HG: CPT | Mod: CPTII,S$GLB,, | Performed by: NURSE PRACTITIONER

## 2023-11-21 PROCEDURE — 99214 OFFICE O/P EST MOD 30 MIN: CPT | Mod: S$GLB,,, | Performed by: NURSE PRACTITIONER

## 2023-11-21 PROCEDURE — 3008F BODY MASS INDEX DOCD: CPT | Mod: CPTII,S$GLB,, | Performed by: NURSE PRACTITIONER

## 2023-11-21 PROCEDURE — 1160F PR REVIEW ALL MEDS BY PRESCRIBER/CLIN PHARMACIST DOCUMENTED: ICD-10-PCS | Mod: CPTII,S$GLB,, | Performed by: NURSE PRACTITIONER

## 2023-11-21 PROCEDURE — 3008F PR BODY MASS INDEX (BMI) DOCUMENTED: ICD-10-PCS | Mod: CPTII,S$GLB,, | Performed by: NURSE PRACTITIONER

## 2023-11-21 PROCEDURE — 1159F PR MEDICATION LIST DOCUMENTED IN MEDICAL RECORD: ICD-10-PCS | Mod: CPTII,S$GLB,, | Performed by: NURSE PRACTITIONER

## 2023-11-21 PROCEDURE — 3074F SYST BP LT 130 MM HG: CPT | Mod: CPTII,S$GLB,, | Performed by: NURSE PRACTITIONER

## 2023-11-21 PROCEDURE — 3078F PR MOST RECENT DIASTOLIC BLOOD PRESSURE < 80 MM HG: ICD-10-PCS | Mod: CPTII,S$GLB,, | Performed by: NURSE PRACTITIONER

## 2023-11-21 PROCEDURE — 3044F PR MOST RECENT HEMOGLOBIN A1C LEVEL <7.0%: ICD-10-PCS | Mod: CPTII,S$GLB,, | Performed by: NURSE PRACTITIONER

## 2023-11-21 PROCEDURE — 3074F PR MOST RECENT SYSTOLIC BLOOD PRESSURE < 130 MM HG: ICD-10-PCS | Mod: CPTII,S$GLB,, | Performed by: NURSE PRACTITIONER

## 2023-11-21 PROCEDURE — 1160F RVW MEDS BY RX/DR IN RCRD: CPT | Mod: CPTII,S$GLB,, | Performed by: NURSE PRACTITIONER

## 2023-11-21 RX ORDER — LEVOTHYROXINE SODIUM 88 UG/1
88 TABLET ORAL DAILY
Qty: 90 TABLET | Refills: 2 | Status: SHIPPED | OUTPATIENT
Start: 2023-11-21

## 2023-11-21 RX ORDER — HYDROCODONE BITARTRATE AND ACETAMINOPHEN 5; 325 MG/1; MG/1
1 TABLET ORAL EVERY 8 HOURS PRN
COMMUNITY
Start: 2023-07-13

## 2023-11-21 RX ORDER — DILTIAZEM HYDROCHLORIDE 240 MG/1
240 CAPSULE, COATED, EXTENDED RELEASE ORAL DAILY
Qty: 90 CAPSULE | Refills: 2 | Status: SHIPPED | OUTPATIENT
Start: 2023-11-21

## 2023-11-21 RX ORDER — IBUPROFEN 800 MG/1
800 TABLET ORAL EVERY 8 HOURS PRN
COMMUNITY
Start: 2023-07-13

## 2023-11-21 NOTE — PROGRESS NOTES
Subjective:       Patient ID: Shira Gloria is a 63 y.o. female.    Chief Complaint: Follow-up    Follow-up  Associated symptoms include arthralgias. Pertinent negatives include no abdominal pain, chest pain, coughing, fatigue, fever, headaches, nausea or vomiting.     Here for follow up. She is doing great after Carpal tunnel surgery and therapy.     Due for labs, mammogram, colon cancer screening. See ROS/assessment and plan    The following portion of the patients history was reviewed and updated as appropriate: allergies, current medications, past medical and surgical history. Past social history and problem list reviewed. Family PMH and Past social history reviewed. Tobacco, Illicit drug use reviewed.      Review of patient's allergies indicates:   Allergen Reactions    Cranberry Rash         Current Outpatient Medications:     diltiaZEM (CARDIZEM CD) 240 MG 24 hr capsule, Take 1 capsule (240 mg total) by mouth once daily., Disp: 90 capsule, Rfl: 1    HYDROcodone-acetaminophen (NORCO) 5-325 mg per tablet, Take 1 tablet by mouth every 8 (eight) hours as needed., Disp: , Rfl:     ibuprofen (ADVIL,MOTRIN) 800 MG tablet, Take 800 mg by mouth every 8 (eight) hours as needed., Disp: , Rfl:     levothyroxine (SYNTHROID) 88 MCG tablet, Take 1 tablet (88 mcg total) by mouth once daily., Disp: 90 tablet, Rfl: 1    Past Medical History:   Diagnosis Date    HTN (hypertension)     Hypothyroidism     Morbid obesity with BMI of 40.0-44.9, adult        Past Surgical History:   Procedure Laterality Date    CARPAL TUNNEL RELEASE         Social History     Socioeconomic History    Marital status: Significant Other   Tobacco Use    Smoking status: Never     Passive exposure: Never    Smokeless tobacco: Never   Substance and Sexual Activity    Alcohol use: Yes     Comment: social       Review of Systems   Constitutional:  Negative for fatigue and fever.   HENT: Negative.     Eyes:  Negative for visual disturbance.   Respiratory:   "Negative for cough, chest tightness, shortness of breath and wheezing.    Cardiovascular:  Negative for chest pain, palpitations and leg swelling.   Gastrointestinal:  Negative for abdominal pain, blood in stool, diarrhea, nausea and vomiting.   Genitourinary: Negative.    Musculoskeletal:  Positive for arthralgias. Negative for back pain and gait problem.   Neurological:  Negative for headaches.   Psychiatric/Behavioral:  Negative for dysphoric mood and sleep disturbance. The patient is not nervous/anxious.        Objective:      /80 (BP Location: Left arm, Patient Position: Sitting, BP Method: Medium (Manual))   Pulse 61   Temp 98.2 °F (36.8 °C)   Ht 5' 1" (1.549 m)   Wt 94.3 kg (207 lb 14.3 oz)   SpO2 98%   BMI 39.28 kg/m²      Physical Exam  Constitutional:       Appearance: Normal appearance. She is obese.   HENT:      Head: Normocephalic.   Eyes:      Pupils: Pupils are equal, round, and reactive to light.   Neck:      Thyroid: No thyromegaly.      Vascular: No carotid bruit.   Cardiovascular:      Rate and Rhythm: Normal rate and regular rhythm.      Pulses: Normal pulses.      Heart sounds: Normal heart sounds. No murmur heard.  Pulmonary:      Effort: Pulmonary effort is normal.      Breath sounds: Normal breath sounds. No wheezing.   Abdominal:      General: Bowel sounds are normal.      Tenderness: There is no abdominal tenderness.   Musculoskeletal:      Cervical back: Normal range of motion.      Right lower leg: No edema.      Left lower leg: No edema.      Comments: Gait normal.  strong, equal   Skin:     General: Skin is warm and dry.      Capillary Refill: Capillary refill takes less than 2 seconds.   Neurological:      General: No focal deficit present.      Mental Status: She is alert.   Psychiatric:         Attention and Perception: Attention and perception normal.         Mood and Affect: Mood and affect normal.         Speech: Speech normal.         Behavior: Behavior normal. "         Assessment:       1. Hypothyroidism due to acquired atrophy of thyroid    2. Rheumatoid arthritis involving both hands with positive rheumatoid factor    3. Hypertension, essential    4. JAYDEN on CPAP    5. Colon cancer screening    6. Encounter for screening mammogram for malignant neoplasm of breast    7. Sacroiliitis, not elsewhere classified    8. Class 2 obesity without serious comorbidity with body mass index (BMI) of 39.0 to 39.9 in adult, unspecified obesity type        Plan:       Hypothyroidism due to acquired atrophy of thyroid: Will adjust dose of thyroid medication if needed based on lab results  -     TSH; Future; Expected date: 11/21/2023    Rheumatoid arthritis involving both hands with positive rheumatoid factor: followed by Rheumatology.     Hypertension, essential: good control on current medications  -     Lipid Panel; Future; Expected date: 11/21/2023  -     Comprehensive Metabolic Panel; Future; Expected date: 11/21/2023    JAYDEN on CPAP: compliant with Cpap    Colon cancer screening  -     Cologuard Screening (Multitarget Stool DNA); Future; Expected date: 11/21/2023    Encounter for screening mammogram for malignant neoplasm of breast  -     Mammo Digital Screening Bilat w/ Nasir; Future; Expected date: 11/21/2023    Sacroiliitis, not elsewhere classified: doing accupuncture and that has helped.    Class 2 obesity without serious comorbidity with body mass index (BMI) of 39.0 to 39.9 in adult, unspecified obesity type: Weight loss encouraged. Follow low fat, low carb diet. Portion control, exercise.     Other orders  -     levothyroxine (SYNTHROID) 88 MCG tablet; Take 1 tablet (88 mcg total) by mouth once daily.  Dispense: 90 tablet; Refill: 2  -     diltiaZEM (CARDIZEM CD) 240 MG 24 hr capsule; Take 1 capsule (240 mg total) by mouth once daily.  Dispense: 90 capsule; Refill: 2    I spent a total of 30 minutes on the day of the visit.     This includes face to face time with the patient,  as well as non-face to face time preparing for and completing the visit (review of prior diagnostic testing and clinical notes, obtaining or reviewing history, documenting clinical information in the EMR, independently interpreting and communicating results to the patient/family and coordinating ongoing care).         Continue current medication  Take medications only as prescribed  Healthy diet, exercise  Adequate rest  Adequate hydration  Avoid allergens  Avoid excessive caffeine     Follow up 6 months

## 2023-12-06 ENCOUNTER — PATIENT OUTREACH (OUTPATIENT)
Dept: ADMINISTRATIVE | Facility: HOSPITAL | Age: 63
End: 2023-12-06
Payer: COMMERCIAL

## 2023-12-15 ENCOUNTER — LAB VISIT (OUTPATIENT)
Dept: LAB | Facility: HOSPITAL | Age: 63
End: 2023-12-15
Payer: COMMERCIAL

## 2023-12-15 DIAGNOSIS — E03.4 HYPOTHYROIDISM DUE TO ACQUIRED ATROPHY OF THYROID: ICD-10-CM

## 2023-12-15 DIAGNOSIS — I10 HYPERTENSION, ESSENTIAL: ICD-10-CM

## 2023-12-15 LAB
ALBUMIN SERPL BCP-MCNC: 3.5 G/DL (ref 3.5–5.2)
ALP SERPL-CCNC: 159 U/L (ref 55–135)
ALT SERPL W/O P-5'-P-CCNC: 24 U/L (ref 10–44)
ANION GAP SERPL CALC-SCNC: 7 MMOL/L (ref 8–16)
AST SERPL-CCNC: 28 U/L (ref 10–40)
BILIRUB SERPL-MCNC: 0.9 MG/DL (ref 0.1–1)
BUN SERPL-MCNC: 10 MG/DL (ref 8–23)
CALCIUM SERPL-MCNC: 9.1 MG/DL (ref 8.7–10.5)
CHLORIDE SERPL-SCNC: 110 MMOL/L (ref 95–110)
CHOLEST SERPL-MCNC: 203 MG/DL (ref 120–199)
CHOLEST/HDLC SERPL: 4.6 {RATIO} (ref 2–5)
CO2 SERPL-SCNC: 24 MMOL/L (ref 23–29)
CREAT SERPL-MCNC: 0.8 MG/DL (ref 0.5–1.4)
EST. GFR  (NO RACE VARIABLE): >60 ML/MIN/1.73 M^2
GLUCOSE SERPL-MCNC: 94 MG/DL (ref 70–110)
HDLC SERPL-MCNC: 44 MG/DL (ref 40–75)
HDLC SERPL: 21.7 % (ref 20–50)
LDLC SERPL CALC-MCNC: 132.4 MG/DL (ref 63–159)
NONHDLC SERPL-MCNC: 159 MG/DL
POTASSIUM SERPL-SCNC: 3.9 MMOL/L (ref 3.5–5.1)
PROT SERPL-MCNC: 7 G/DL (ref 6–8.4)
SODIUM SERPL-SCNC: 141 MMOL/L (ref 136–145)
TRIGL SERPL-MCNC: 133 MG/DL (ref 30–150)
TSH SERPL DL<=0.005 MIU/L-ACNC: 1.32 UIU/ML (ref 0.4–4)

## 2023-12-15 PROCEDURE — 36415 COLL VENOUS BLD VENIPUNCTURE: CPT | Mod: PO | Performed by: NURSE PRACTITIONER

## 2023-12-15 PROCEDURE — 80053 COMPREHEN METABOLIC PANEL: CPT | Performed by: NURSE PRACTITIONER

## 2023-12-15 PROCEDURE — 80061 LIPID PANEL: CPT | Performed by: NURSE PRACTITIONER

## 2023-12-15 PROCEDURE — 84443 ASSAY THYROID STIM HORMONE: CPT | Performed by: NURSE PRACTITIONER

## 2023-12-20 ENCOUNTER — PATIENT MESSAGE (OUTPATIENT)
Dept: FAMILY MEDICINE | Facility: CLINIC | Age: 63
End: 2023-12-20
Payer: COMMERCIAL

## 2023-12-27 ENCOUNTER — PATIENT MESSAGE (OUTPATIENT)
Dept: FAMILY MEDICINE | Facility: CLINIC | Age: 63
End: 2023-12-27
Payer: COMMERCIAL

## 2023-12-27 LAB — NONINV COLON CA DNA+OCC BLD SCRN STL QL: NEGATIVE

## 2024-01-19 ENCOUNTER — PATIENT MESSAGE (OUTPATIENT)
Dept: FAMILY MEDICINE | Facility: CLINIC | Age: 64
End: 2024-01-19
Payer: COMMERCIAL

## 2024-01-22 ENCOUNTER — TELEPHONE (OUTPATIENT)
Dept: RHEUMATOLOGY | Facility: CLINIC | Age: 64
End: 2024-01-22
Payer: COMMERCIAL

## 2024-01-22 NOTE — TELEPHONE ENCOUNTER
----- Message from Ericka Lord, Patient Care Assistant sent at 1/22/2024  8:31 AM CST -----  Regarding: appointment  Contact: pt  Type:  Sooner Appointment Request    Caller is requesting a sooner appointment.  Caller declined first available appointment listed below.  Caller will not accept being placed on the waitlist and is requesting a message be sent to doctor.    Name of Caller:  pt     When is the first available appointment?  2024     Symptoms:  new pt est care     Would the patient rather a call back or a response via [facility]? Callback     Best Call Back Number:  [phone] (home)     Additional Information:  please call to advise. Thanks!

## 2024-01-30 ENCOUNTER — OFFICE VISIT (OUTPATIENT)
Dept: RHEUMATOLOGY | Facility: CLINIC | Age: 64
End: 2024-01-30
Payer: COMMERCIAL

## 2024-01-30 VITALS
HEART RATE: 64 BPM | WEIGHT: 219.56 LBS | SYSTOLIC BLOOD PRESSURE: 120 MMHG | DIASTOLIC BLOOD PRESSURE: 81 MMHG | BODY MASS INDEX: 41.45 KG/M2 | HEIGHT: 61 IN

## 2024-01-30 DIAGNOSIS — M25.50 POLYARTHRALGIA: Primary | ICD-10-CM

## 2024-01-30 PROCEDURE — 3008F BODY MASS INDEX DOCD: CPT | Mod: CPTII,S$GLB,, | Performed by: INTERNAL MEDICINE

## 2024-01-30 PROCEDURE — 99999 PR PBB SHADOW E&M-EST. PATIENT-LVL III: CPT | Mod: PBBFAC,,, | Performed by: INTERNAL MEDICINE

## 2024-01-30 PROCEDURE — 99215 OFFICE O/P EST HI 40 MIN: CPT | Mod: S$GLB,,, | Performed by: INTERNAL MEDICINE

## 2024-01-30 PROCEDURE — 3074F SYST BP LT 130 MM HG: CPT | Mod: CPTII,S$GLB,, | Performed by: INTERNAL MEDICINE

## 2024-01-30 PROCEDURE — 1159F MED LIST DOCD IN RCRD: CPT | Mod: CPTII,S$GLB,, | Performed by: INTERNAL MEDICINE

## 2024-01-30 PROCEDURE — 3079F DIAST BP 80-89 MM HG: CPT | Mod: CPTII,S$GLB,, | Performed by: INTERNAL MEDICINE

## 2024-01-30 ASSESSMENT — ROUTINE ASSESSMENT OF PATIENT INDEX DATA (RAPID3)
AM STIFFNESS SCORE: 1, YES
PAIN SCORE: 3
TOTAL RAPID3 SCORE: 1.39
PATIENT GLOBAL ASSESSMENT SCORE: 0.5
MDHAQ FUNCTION SCORE: 0.2
PSYCHOLOGICAL DISTRESS SCORE: 0
WHEN YOU AWAKENED IN THE MORNING OVER THE LAST WEEK, PLEASE INDICATE THE AMOUNT OF TIME IT TAKES UNTIL YOU ARE AS LIMBER AS YOU WILL BE FOR THE DAY: 1
FATIGUE SCORE: 0

## 2024-01-30 NOTE — PROGRESS NOTES
Subjective:       Patient ID: Shira Gloria is a 61 y.o. female.    Chief Complaint: Disease Management    HPI  61 year old F with PMH of hypothyroidism, HTN, JAYDEN, nephrolithiasis here for evaluation.She has been having pain for 18 months.  She has pain in shoulders, wrists, hands, and hips.   Pain level can be as high as 8/10. She takes advil 2 pills daily.  She reports ice is most helpful. She used to take meloxicam and it did not help her pain. Tramadol does not help her pain.  Denies any hair loss, oral ulcers, fevers, or raynauds.    Denies taking steroids.  Family hx: negative for RA  Grandmother:SLE     Interval history: She had CTS release on right hand with significant improvement in right wrist and right hand.   She is doing acupuncture with improvement.  She still has pain in right lateral wrist.  She is having pain in jaw for over 6 months.  She has one toe in right foot that is throbbing.   On occasion, she gets swelling in her knees.  She will get swelling in left lateral wrists. She is stiff in left lateral wrist,.  She is stiff in morning for 30 to 45 minutes. She denies history of smoking or being exposed.    Review of Systems   Constitutional: Negative for activity change, appetite change, chills, diaphoresis, fatigue and fever.   HENT: Negative for congestion, ear discharge, ear pain, facial swelling, mouth sores, sinus pressure, sneezing, sore throat, tinnitus and trouble swallowing.    Eyes: Negative for photophobia, pain, discharge, redness, itching and visual disturbance.   Respiratory: Negative for apnea, cough, chest tightness, shortness of breath, wheezing and stridor.    Cardiovascular: Negative for chest pain and leg swelling.   Gastrointestinal: Negative for abdominal distention, abdominal pain, anal bleeding, blood in stool, constipation, diarrhea and nausea.   Endocrine: Negative for cold intolerance and heat intolerance.   Genitourinary: Negative for difficulty urinating, dysuria and  genital sores.   Musculoskeletal: Positive for arthralgias and joint swelling. Negative for back pain, gait problem, myalgias, neck pain and neck stiffness.   Skin: Negative for color change, pallor, rash and wound.   Neurological: Negative for dizziness, seizures, light-headedness, numbness and headaches.   Hematological: Negative for adenopathy. Does not bruise/bleed easily.   Psychiatric/Behavioral: Negative for sleep disturbance. The patient is not nervous/anxious.            Objective:     Physical Exam   Constitutional: She is oriented to person, place, and time.   HENT:   Head: Normocephalic and atraumatic.   Right Ear: External ear normal.   Left Ear: External ear normal.   Nose: Nose normal.   Mouth/Throat: Oropharynx is clear and moist. No oropharyngeal exudate.   Eyes: Pupils are equal, round, and reactive to light. Conjunctivae are normal. Right eye exhibits no discharge. Left eye exhibits no discharge. No scleral icterus.   Neck: No JVD present. No thyromegaly present.   Cardiovascular: Normal rate, regular rhythm and normal heart sounds. Exam reveals no gallop and no friction rub.   No murmur heard.  Pulmonary/Chest: Effort normal and breath sounds normal. No respiratory distress. She has no wheezes. She has no rales. She exhibits no tenderness.   Abdominal: Soft. Bowel sounds are normal. She exhibits no distension and no mass. There is no abdominal tenderness. There is no rebound and no guarding.   Musculoskeletal:         General: No swelling, tenderness, deformity or signs of injury.      Cervical back: Neck supple.   Lymphadenopathy:     She has no cervical adenopathy.   Neurological: She is alert and oriented to person, place, and time. No cranial nerve deficit. Gait normal. Coordination normal.   Skin: Skin is dry. No rash noted. No erythema. No pallor.   Psychiatric: Affect and judgment normal.        No data to display     Assessment:      63 year old F with PMH of hypothyroidism, HTN, JAYDEN,  nephrolithiasis here for evaluation of polyarthralgias.  She has +RF and +CCP but I detected no synovitis on initial visit.      1. Arthralgia of both hands            Plan:   Labs  Xrays   Consider CT of left wrist and right foot  rtc in 3- 6 months      40 * minutes of total time spent on the encounter, which includes face to face time and non-face to face time preparing to see the patient (eg, review of tests), Obtaining and/or reviewing separately obtained history, Documenting clinical information in the electronic or other health record, Independently interpreting results (not separately reported) and communicating results to the patient/family/caregiver, or Care coordination (not separately reported).

## 2024-01-30 NOTE — PROGRESS NOTES
1/29/2024     7:54 PM   Rapid3 Question Responses and Scores   MDHAQ Score 0.2   Psychologic Score 0   Pain Score 3   When you awakened in the morning OVER THE LAST WEEK, did you feel stiff? Yes   If Yes, please indicate the number of hours until you are as limber as you will be for the day 1   Fatigue Score 0   Global Health Score 0.5   RAPID3 Score 1.39        Answers submitted by the patient for this visit:  Rheumatology Questionnaire (Submitted on 1/29/2024)  fever: No  eye redness: No  mouth sores: No  headaches: No  shortness of breath: No  chest pain: No  trouble swallowing: No  diarrhea: No  constipation: No  unexpected weight change: No  genital sore: No  dysuria: No  During the last 3 days, have you had a skin rash?: No  Bruises or bleeds easily: No  cough: No

## 2024-02-09 ENCOUNTER — HOSPITAL ENCOUNTER (OUTPATIENT)
Dept: RADIOLOGY | Facility: HOSPITAL | Age: 64
Discharge: HOME | End: 2024-02-09
Attending: INTERNAL MEDICINE
Payer: COMMERCIAL

## 2024-02-09 DIAGNOSIS — M25.50 POLYARTHRALGIA: ICD-10-CM

## 2024-02-09 PROCEDURE — 77077 JOINT SURVEY SINGLE VIEW: CPT | Mod: TC,PO

## 2024-02-09 PROCEDURE — 77077 JOINT SURVEY SINGLE VIEW: CPT | Mod: 26,,, | Performed by: RADIOLOGY

## 2024-03-08 ENCOUNTER — PATIENT MESSAGE (OUTPATIENT)
Dept: FAMILY MEDICINE | Facility: CLINIC | Age: 64
End: 2024-03-08
Payer: COMMERCIAL

## 2024-03-08 ENCOUNTER — CLINICAL SUPPORT (OUTPATIENT)
Dept: REHABILITATION | Facility: HOSPITAL | Age: 64
End: 2024-03-08
Attending: INTERNAL MEDICINE
Payer: COMMERCIAL

## 2024-03-08 DIAGNOSIS — M25.50 POLYARTHRALGIA: ICD-10-CM

## 2024-03-08 PROCEDURE — 97811 ACUP 1/> W/O ESTIM EA ADD 15: CPT | Mod: PO

## 2024-03-08 PROCEDURE — 97810 ACUP 1/> WO ESTIM 1ST 15 MIN: CPT | Mod: PO

## 2024-03-11 ENCOUNTER — TELEPHONE (OUTPATIENT)
Dept: FAMILY MEDICINE | Facility: CLINIC | Age: 64
End: 2024-03-11
Payer: COMMERCIAL

## 2024-03-11 NOTE — TELEPHONE ENCOUNTER
----- Message from Belen Rainey sent at 3/11/2024  9:00 AM CDT -----  Contact: walk in  Tonia Randolph, pt's partner, dropped off a form from [employer] to be completed.  Pls call when ready for . [phone]   In box.

## 2024-03-11 NOTE — PROGRESS NOTES
Acupuncture Evaluation Note     Name: Shira Gloria  Clinic Number: [id number]    Traditional Chinese Medicine (TCM) Diagnosis: Qi Stagnation  Medical Diagnosis:   Encounter Diagnosis   Name Primary?    Polyarthralgia         Evaluation Date: 3/8/24    Visit #/Visits authorized: 1/12    Precautions: Standard    Subjective     Chief Concern: Polyarthralgia, chronic pain, various joints in chronic pain including shoulders/joints of neck/knees, etc.    Medical necessity is demonstrated by the following IMPAIRMENTS: Medical Necessity: Decreased mobility limits day to day activities, social, and emergent situations              Aggravating Factors:  movement and pressure   Relieving Factors:  rest    Symptom Description:     Quality:  Aching  Severity:  5  Frequency:  when active      Objective     Pulse:        wiry       Treatment       Acupuncture points used:    Bilateral points:K3, UB 62, SI4/3, TB 5, GB 20  Needles In: 10  Needles Out: 10  Needles W/O STIM placed: 12:30pm  [place] W/O STIM removed: 1:15pm        Assessment     After treatment, patient felt more relaxed and less strain    Patient prognosis is Good.     Patient will continue to benefit from acupuncture treatment to address the deficits listed in the problem list box on initial evaluation, provide patient family education and to maximize pt's level of independence in the home and community environment.     Patient's spiritual, cultural and educational needs considered and pt agreeable to plan of care and goals.     Anticipated barriers to treatment: none    Plan     Recommend every couple weeks for 12 sessions with midway re-assess.      Education:  Patient is aware of cumulative benefit of acupuncture

## 2024-03-12 NOTE — TELEPHONE ENCOUNTER
Left message for pt informing her that her paperwork is completed and ready to be picked up at the .

## 2024-03-19 ENCOUNTER — PATIENT MESSAGE (OUTPATIENT)
Dept: RHEUMATOLOGY | Facility: CLINIC | Age: 64
End: 2024-03-19

## 2024-03-19 ENCOUNTER — OFFICE VISIT (OUTPATIENT)
Dept: RHEUMATOLOGY | Facility: CLINIC | Age: 64
End: 2024-03-19
Payer: COMMERCIAL

## 2024-03-19 VITALS
BODY MASS INDEX: 40.96 KG/M2 | WEIGHT: 216.94 LBS | DIASTOLIC BLOOD PRESSURE: 72 MMHG | HEIGHT: 61 IN | SYSTOLIC BLOOD PRESSURE: 131 MMHG | HEART RATE: 59 BPM

## 2024-03-19 DIAGNOSIS — E66.01 CLASS 3 OBESITY: ICD-10-CM

## 2024-03-19 DIAGNOSIS — M25.50 POLYARTHRALGIA: Primary | ICD-10-CM

## 2024-03-19 DIAGNOSIS — M05.742 RHEUMATOID ARTHRITIS INVOLVING BOTH HANDS WITH POSITIVE RHEUMATOID FACTOR: ICD-10-CM

## 2024-03-19 DIAGNOSIS — M05.741 RHEUMATOID ARTHRITIS INVOLVING BOTH HANDS WITH POSITIVE RHEUMATOID FACTOR: ICD-10-CM

## 2024-03-19 PROCEDURE — 3075F SYST BP GE 130 - 139MM HG: CPT | Mod: CPTII,S$GLB,, | Performed by: INTERNAL MEDICINE

## 2024-03-19 PROCEDURE — 3078F DIAST BP <80 MM HG: CPT | Mod: CPTII,S$GLB,, | Performed by: INTERNAL MEDICINE

## 2024-03-19 PROCEDURE — 99999 PR PBB SHADOW E&M-EST. PATIENT-LVL III: CPT | Mod: PBBFAC,,, | Performed by: INTERNAL MEDICINE

## 2024-03-19 PROCEDURE — 3008F BODY MASS INDEX DOCD: CPT | Mod: CPTII,S$GLB,, | Performed by: INTERNAL MEDICINE

## 2024-03-19 PROCEDURE — 99214 OFFICE O/P EST MOD 30 MIN: CPT | Mod: S$GLB,,, | Performed by: INTERNAL MEDICINE

## 2024-03-19 NOTE — PROGRESS NOTES
Subjective:       Patient ID: Shira Gloria is a 61 y.o. female.    Chief Complaint: Disease Management    HPI  61 year old F with PMH of hypothyroidism, HTN, JAYDEN, nephrolithiasis here for evaluation.She has been having pain for 18 months.  She has pain in shoulders, wrists, hands, and hips.   Pain level can be as high as 8/10. She takes advil 2 pills daily.  She reports ice is most helpful. She used to take meloxicam and it did not help her pain. Tramadol does not help her pain.  Denies any hair loss, oral ulcers, fevers, or raynauds.    Denies taking steroids.  Family hx: negative for RA  Grandmother:SLE     Interval history: She has pain in neck, both shoulders, wrists, hands, knees, right big toe, and right jaw.  She can be 8/10 on occasion.  She has one toe in right foot that is throbbing.   On occasion, she gets swelling in her knees.  She will get swelling in left lateral wrists.   She is stiff in morning for 30 to 45 minutes. She denies history of smoking or being exposed.    Review of Systems   Constitutional: Negative for activity change, appetite change, chills, diaphoresis, fatigue and fever.   HENT: Negative for congestion, ear discharge, ear pain, facial swelling, mouth sores, sinus pressure, sneezing, sore throat, tinnitus and trouble swallowing.    Eyes: Negative for photophobia, pain, discharge, redness, itching and visual disturbance.   Respiratory: Negative for apnea, cough, chest tightness, shortness of breath, wheezing and stridor.    Cardiovascular: Negative for chest pain and leg swelling.   Gastrointestinal: Negative for abdominal distention, abdominal pain, anal bleeding, blood in stool, constipation, diarrhea and nausea.   Endocrine: Negative for cold intolerance and heat intolerance.   Genitourinary: Negative for difficulty urinating, dysuria and genital sores.   Musculoskeletal: Positive for arthralgias and joint swelling. Negative for back pain, gait problem, myalgias, neck pain and  neck stiffness.   Skin: Negative for color change, pallor, rash and wound.   Neurological: Negative for dizziness, seizures, light-headedness, numbness and headaches.   Hematological: Negative for adenopathy. Does not bruise/bleed easily.   Psychiatric/Behavioral: Negative for sleep disturbance. The patient is not nervous/anxious.            Objective:     Physical Exam   Constitutional: She is oriented to person, place, and time.   HENT:   Head: Normocephalic and atraumatic.   Right Ear: External ear normal.   Left Ear: External ear normal.   Nose: Nose normal.   Mouth/Throat: Oropharynx is clear and moist. No oropharyngeal exudate.   Eyes: Pupils are equal, round, and reactive to light. Conjunctivae are normal. Right eye exhibits no discharge. Left eye exhibits no discharge. No scleral icterus.   Neck: No JVD present. No thyromegaly present.   Cardiovascular: Normal rate, regular rhythm and normal heart sounds. Exam reveals no gallop and no friction rub.   No murmur heard.  Pulmonary/Chest: Effort normal and breath sounds normal. No respiratory distress. She has no wheezes. She has no rales. She exhibits no tenderness.   Abdominal: Soft. Bowel sounds are normal. She exhibits no distension and no mass. There is no abdominal tenderness. There is no rebound and no guarding.   Musculoskeletal:         General: No swelling, tenderness, deformity or signs of injury.      Cervical back: Neck supple.   Lymphadenopathy:     She has no cervical adenopathy.   Neurological: She is alert and oriented to person, place, and time. No cranial nerve deficit. Gait normal. Coordination normal.   Skin: Skin is dry. No rash noted. No erythema. No pallor.   Psychiatric: Affect and judgment normal.        No data to display     Assessment:      63 year old F with PMH of hypothyroidism, HTN, JAYDEN, nephrolithiasis here for evaluation of polyarthralgias.  She has +RF and +CCP and reports bilateral symmetrical joint pain which is consistent  with seropositive RA.  I discussed with patient to consider MTX but she prefers to wait.  She will be going out of town so will give her course of steroids in case she needs it.      1. Arthralgia of both hands            Plan:   rtc in 3- 6 months    Risks and benefits of initiating MTX discussed. Patient aware that risks include and not limited to lung toxicity, liver abnormalities, cell count abnormalities, malignancy, and allergic  reaction to medication.   Risks of TNF inhibitor discussed with patient and not limited to cell count abnormalities, malignancy, allergic  reaction to medication, and increased risk of infection. Patient agrees with starting medication.      40 * minutes of total time spent on the encounter, which includes face to face time and non-face to face time preparing to see the patient (eg, review of tests), Obtaining and/or reviewing separately obtained history, Documenting clinical information in the electronic or other health record, Independently interpreting results (not separately reported) and communicating results to the patient/family/caregiver, or Care coordination (not separately reported).

## 2024-03-21 RX ORDER — PREDNISONE 20 MG/1
20 TABLET ORAL DAILY
Qty: 10 TABLET | Refills: 0 | Status: SHIPPED | OUTPATIENT
Start: 2024-03-21 | End: 2024-03-31

## 2024-05-10 ENCOUNTER — CLINICAL SUPPORT (OUTPATIENT)
Dept: REHABILITATION | Facility: HOSPITAL | Age: 64
End: 2024-05-10
Payer: COMMERCIAL

## 2024-05-10 DIAGNOSIS — M25.50 POLYARTHRALGIA: Primary | ICD-10-CM

## 2024-05-10 PROCEDURE — 97810 ACUP 1/> WO ESTIM 1ST 15 MIN: CPT | Mod: PO

## 2024-05-10 PROCEDURE — 97811 ACUP 1/> W/O ESTIM EA ADD 15: CPT | Mod: PO

## 2024-05-12 NOTE — PROGRESS NOTES
Acupuncture Evaluation Note     Name: Shira Gloria  Clinic Number: [id number]    Traditional Chinese Medicine (TCM) Diagnosis: Qi Stagnation  Medical Diagnosis:   Encounter Diagnosis   Name Primary?    Polyarthralgia Yes        Evaluation Date: 5/10/24    Visit #/Visits authorized: 2/12    Precautions: Standard    Subjective     Chief Concern: Polyarthralgia, chronic pain, various joints in chronic pain including shoulders/joints of neck/knees, etc.    Medical necessity is demonstrated by the following IMPAIRMENTS: Medical Necessity: Decreased mobility limits day to day activities, social, and emergent situations              Aggravating Factors:  movement and pressure   Relieving Factors:  rest    Symptom Description:     Quality:  Aching  Severity:  5  Frequency:  when active      Objective     Pulse:        wiry       Treatment       Acupuncture points used:    Bilateral points:K3, UB 62, SI4/3, TB 5, GB 20  Needles In: 10  Needles Out: 10  Needles W/O STIM placed: 11am  Needles W/O STIM removed: 11:45am        Assessment     After treatment, patient felt more relaxed and less strain    Patient prognosis is Good.     Patient will continue to benefit from acupuncture treatment to address the deficits listed in the problem list box on initial evaluation, provide patient family education and to maximize pt's level of independence in the home and community environment.     Patient's spiritual, cultural and educational needs considered and pt agreeable to plan of care and goals.     Anticipated barriers to treatment: none    Plan     Recommend every couple weeks for 12 sessions with midway re-assess.      Education:  Patient is aware of cumulative benefit of acupuncture

## 2024-05-13 RX ORDER — LEVOTHYROXINE SODIUM 88 UG/1
88 TABLET ORAL DAILY
Qty: 90 TABLET | Refills: 2 | Status: SHIPPED | OUTPATIENT
Start: 2024-05-13

## 2024-05-13 RX ORDER — DILTIAZEM HYDROCHLORIDE 240 MG/1
240 CAPSULE, COATED, EXTENDED RELEASE ORAL DAILY
Qty: 90 CAPSULE | Refills: 2 | Status: SHIPPED | OUTPATIENT
Start: 2024-05-13

## 2024-05-21 ENCOUNTER — OFFICE VISIT (OUTPATIENT)
Dept: FAMILY MEDICINE | Facility: CLINIC | Age: 64
End: 2024-05-21
Payer: COMMERCIAL

## 2024-05-21 DIAGNOSIS — R39.9 UTI SYMPTOMS: ICD-10-CM

## 2024-05-21 DIAGNOSIS — E66.01 CLASS 3 OBESITY: ICD-10-CM

## 2024-05-21 DIAGNOSIS — M05.741 RHEUMATOID ARTHRITIS INVOLVING BOTH HANDS WITH POSITIVE RHEUMATOID FACTOR: ICD-10-CM

## 2024-05-21 DIAGNOSIS — M05.742 RHEUMATOID ARTHRITIS INVOLVING BOTH HANDS WITH POSITIVE RHEUMATOID FACTOR: ICD-10-CM

## 2024-05-21 DIAGNOSIS — G47.33 OSA ON CPAP: ICD-10-CM

## 2024-05-21 DIAGNOSIS — Z00.00 ANNUAL PHYSICAL EXAM: Primary | ICD-10-CM

## 2024-05-21 DIAGNOSIS — E03.4 HYPOTHYROIDISM DUE TO ACQUIRED ATROPHY OF THYROID: ICD-10-CM

## 2024-05-21 DIAGNOSIS — I10 HYPERTENSION, ESSENTIAL: ICD-10-CM

## 2024-05-21 LAB
BILIRUBIN, UA POC OHS: NEGATIVE
BLOOD, UA POC OHS: ABNORMAL
CLARITY, UA POC OHS: ABNORMAL
COLOR, UA POC OHS: YELLOW
GLUCOSE, UA POC OHS: NEGATIVE
KETONES, UA POC OHS: NEGATIVE
LEUKOCYTES, UA POC OHS: ABNORMAL
NITRITE, UA POC OHS: NEGATIVE
PH, UA POC OHS: 6
PROTEIN, UA POC OHS: 100
SPECIFIC GRAVITY, UA POC OHS: >=1.03
UROBILINOGEN, UA POC OHS: 0.2

## 2024-05-21 PROCEDURE — 87086 URINE CULTURE/COLONY COUNT: CPT | Performed by: NURSE PRACTITIONER

## 2024-05-21 PROCEDURE — 81003 URINALYSIS AUTO W/O SCOPE: CPT | Mod: QW,S$GLB,, | Performed by: NURSE PRACTITIONER

## 2024-05-21 PROCEDURE — 3008F BODY MASS INDEX DOCD: CPT | Mod: CPTII,S$GLB,, | Performed by: NURSE PRACTITIONER

## 2024-05-21 PROCEDURE — 87088 URINE BACTERIA CULTURE: CPT | Performed by: NURSE PRACTITIONER

## 2024-05-21 PROCEDURE — 87186 SC STD MICRODIL/AGAR DIL: CPT | Performed by: NURSE PRACTITIONER

## 2024-05-21 PROCEDURE — 1160F RVW MEDS BY RX/DR IN RCRD: CPT | Mod: CPTII,S$GLB,, | Performed by: NURSE PRACTITIONER

## 2024-05-21 PROCEDURE — 99396 PREV VISIT EST AGE 40-64: CPT | Mod: S$GLB,,, | Performed by: NURSE PRACTITIONER

## 2024-05-21 PROCEDURE — 87077 CULTURE AEROBIC IDENTIFY: CPT | Performed by: NURSE PRACTITIONER

## 2024-05-21 PROCEDURE — 3079F DIAST BP 80-89 MM HG: CPT | Mod: CPTII,S$GLB,, | Performed by: NURSE PRACTITIONER

## 2024-05-21 PROCEDURE — 3075F SYST BP GE 130 - 139MM HG: CPT | Mod: CPTII,S$GLB,, | Performed by: NURSE PRACTITIONER

## 2024-05-21 PROCEDURE — 1159F MED LIST DOCD IN RCRD: CPT | Mod: CPTII,S$GLB,, | Performed by: NURSE PRACTITIONER

## 2024-05-21 RX ORDER — NITROFURANTOIN 25; 75 MG/1; MG/1
100 CAPSULE ORAL 2 TIMES DAILY
Qty: 14 CAPSULE | Refills: 0 | Status: SHIPPED | OUTPATIENT
Start: 2024-05-21

## 2024-05-21 RX ORDER — SULFAMETHOXAZOLE AND TRIMETHOPRIM 800; 160 MG/1; MG/1
1 TABLET ORAL 2 TIMES DAILY
Qty: 8 TABLET | Refills: 0 | Status: SHIPPED | OUTPATIENT
Start: 2024-05-21

## 2024-05-21 NOTE — PROGRESS NOTES
Subjective:       Patient ID: Shira Gloria is a 63 y.o. female.    Chief Complaint: Annual Exam    HPI here for annual exam. Most recent labs reviewed.     She is followed by Rheumatology. Diagnosed with RA. She is going to [place] for the summer. States she is planning to start the RA medication when she comes back from vacation.     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     She is having symptoms of UTI. Frequency, urgency. No fever.    See ROS    The following portion of the patients history was reviewed and updated as appropriate: allergies, current medications, past medical and surgical history. Past social history and problem list reviewed. Family PMH and Past social history reviewed. Tobacco, Illicit drug use reviewed.      Review of patient's allergies indicates:   Allergen Reactions    Cranberry Rash         Current Outpatient Medications:     diltiaZEM (CARDIZEM CD) 240 MG 24 hr capsule, Take 1 capsule (240 mg total) by mouth once daily., Disp: 90 capsule, Rfl: 2    levothyroxine (SYNTHROID) 88 MCG tablet, Take 1 tablet (88 mcg total) by mouth once daily., Disp: 90 tablet, Rfl: 2    Past Medical History:   Diagnosis Date    HTN (hypertension)     Hypothyroidism     Morbid obesity with BMI of 40.0-44.9, adult        Past Surgical History:   Procedure Laterality Date    CARPAL TUNNEL RELEASE         Social History     Socioeconomic History    Marital status: Significant Other   Tobacco Use    Smoking status: Never     Passive exposure: Never    Smokeless tobacco: Never   Substance and Sexual Activity    Alcohol use: Yes     Comment: social     Social Determinants of Health     Financial Resource Strain: Low Risk  (1/29/2024)    Overall Financial Resource Strain (CARDIA)     Difficulty of Paying Living Expenses: Not very hard   Food Insecurity: No Food Insecurity (1/29/2024)    Hunger Vital Sign     Worried About Running Out of Food in the Last Year: Never true     Ran  "Out of Food in the Last Year: Never true   Transportation Needs: No Transportation Needs (1/29/2024)    PRAPARE - Transportation     Lack of Transportation (Medical): No     Lack of Transportation (Non-Medical): No   Physical Activity: Insufficiently Active (1/29/2024)    Exercise Vital Sign     Days of Exercise per Week: 1 day     Minutes of Exercise per Session: 20 min   Stress: No Stress Concern Present (1/29/2024)    [employer] [place] [employer] - Occupational Stress Questionnaire     Feeling of Stress : Not at all   Housing Stability: Low Risk  (1/29/2024)    Housing Stability Vital Sign     Unable to Pay for Housing in the Last Year: No     Number of Places Lived in the Last Year: 1     Unstable Housing in the Last Year: No     Review of Systems   Constitutional:  Negative for fatigue and fever.   HENT: Negative.     Eyes:  Negative for visual disturbance.   Respiratory:  Negative for cough, chest tightness, shortness of breath and wheezing.    Cardiovascular:  Negative for chest pain, palpitations and leg swelling.   Gastrointestinal:  Negative for abdominal pain, blood in stool, diarrhea, nausea and vomiting.   Genitourinary:  Positive for frequency and urgency.   Musculoskeletal:  Positive for arthralgias. Negative for back pain and gait problem.   Skin: Negative.    Neurological:  Negative for headaches.   Psychiatric/Behavioral:  Negative for dysphoric mood and sleep disturbance. The patient is not nervous/anxious.        Objective:      /82 (BP Location: Left arm, Patient Position: Sitting, BP Method: X-Large (Manual))   Pulse 62   Temp 98.4 °F (36.9 °C)   Resp 16   Ht 5' 1" (1.549 m)   Wt 97.8 kg (215 lb 9.8 oz)   SpO2 97%   BMI 40.74 kg/m²      Physical Exam  Constitutional:       Appearance: Normal appearance. She is morbidly obese.   HENT:      Head: Normocephalic.   Eyes:      Pupils: Pupils are equal, round, and reactive to light.   Neck:      Thyroid: No thyromegaly.      " Vascular: No carotid bruit.   Cardiovascular:      Rate and Rhythm: Normal rate and regular rhythm.      Pulses: Normal pulses.      Heart sounds: Normal heart sounds. No murmur heard.  Pulmonary:      Effort: Pulmonary effort is normal.      Breath sounds: Normal breath sounds. No wheezing.   Abdominal:      General: Bowel sounds are normal.      Tenderness: There is no abdominal tenderness.   Musculoskeletal:         General: Normal range of motion.      Cervical back: Normal range of motion.      Right lower leg: No edema.      Left lower leg: No edema.      Comments: Gait normal.  strong, equal   Skin:     General: Skin is warm and dry.      Capillary Refill: Capillary refill takes less than 2 seconds.   Neurological:      General: No focal deficit present.      Mental Status: She is alert.   Psychiatric:         Attention and Perception: Attention and perception normal.         Mood and Affect: Mood and affect normal.         Speech: Speech normal.         Behavior: Behavior normal.         Assessment:       1. Annual physical exam    2. Hypertension, essential    3. Hypothyroidism due to acquired atrophy of thyroid    4. Class 3 obesity    5. Rheumatoid arthritis involving both hands with positive rheumatoid factor    6. JAYDEN on CPAP    7. UTI symptoms        Plan:       Annual physical exam    Hypertension, essential: stable on current medication    Hypothyroidism due to acquired atrophy of thyroid: Will adjust dose of thyroid medication if needed based on lab results    Class 3 obesity: weight loss encouraged.    Rheumatoid arthritis involving both hands with positive rheumatoid factor: follow up with Rheumatology. Start medication as prescribed.     JAYDEN on CPAP: compliant with Cpap    UTI symptoms: start on antibiotics  culture grew E. Coli  -     POCT Urinalysis(Instrument)  -     CULTURE, URINE    Other orders  -     sulfamethoxazole-trimethoprim 800-160mg (BACTRIM DS) 800-160 mg Tab; Take 1 tablet by  mouth 2 (two) times daily.  Dispense: 8 tablet; Refill: 0:  To take on her trip  -     nitrofurantoin, macrocrystal-monohydrate, (MACROBID) 100 MG capsule; Take 1 capsule (100 mg total) by mouth 2 (two) times daily.  Dispense: 14 capsule; Refill: 0       Continue current medication  Take medications only as prescribed  Healthy diet, exercise  Adequate rest  Adequate hydration  Avoid allergens  Avoid excessive caffeine     Follow up if not improving

## 2024-05-24 LAB — BACTERIA UR CULT: ABNORMAL

## 2024-05-26 ENCOUNTER — PATIENT MESSAGE (OUTPATIENT)
Dept: FAMILY MEDICINE | Facility: CLINIC | Age: 64
End: 2024-05-26
Payer: COMMERCIAL

## 2024-05-27 ENCOUNTER — PATIENT MESSAGE (OUTPATIENT)
Dept: RHEUMATOLOGY | Facility: CLINIC | Age: 64
End: 2024-05-27
Payer: COMMERCIAL

## 2024-05-27 DIAGNOSIS — M05.741 RHEUMATOID ARTHRITIS INVOLVING BOTH HANDS WITH POSITIVE RHEUMATOID FACTOR: Primary | ICD-10-CM

## 2024-05-27 DIAGNOSIS — M05.741 RHEUMATOID ARTHRITIS INVOLVING BOTH HANDS WITH POSITIVE RHEUMATOID FACTOR: ICD-10-CM

## 2024-05-27 DIAGNOSIS — M05.742 RHEUMATOID ARTHRITIS INVOLVING BOTH HANDS WITH POSITIVE RHEUMATOID FACTOR: ICD-10-CM

## 2024-05-27 DIAGNOSIS — M05.742 RHEUMATOID ARTHRITIS INVOLVING BOTH HANDS WITH POSITIVE RHEUMATOID FACTOR: Primary | ICD-10-CM

## 2024-05-27 RX ORDER — PREDNISONE 5 MG/1
5 TABLET ORAL DAILY
Qty: 90 TABLET | Refills: 0 | Status: SHIPPED | OUTPATIENT
Start: 2024-05-27 | End: 2024-08-25

## 2024-05-27 RX ORDER — PREDNISONE 5 MG/1
5 TABLET ORAL DAILY
OUTPATIENT
Start: 2024-05-27

## 2024-05-28 VITALS
TEMPERATURE: 98 F | RESPIRATION RATE: 16 BRPM | BODY MASS INDEX: 40.71 KG/M2 | WEIGHT: 215.63 LBS | HEART RATE: 62 BPM | SYSTOLIC BLOOD PRESSURE: 134 MMHG | OXYGEN SATURATION: 97 % | HEIGHT: 61 IN | DIASTOLIC BLOOD PRESSURE: 82 MMHG

## 2024-05-30 RX ORDER — PREDNISONE 5 MG/1
5 TABLET ORAL DAILY
Qty: 90 TABLET | Refills: 0 | OUTPATIENT
Start: 2024-05-30 | End: 2024-08-28

## 2024-06-28 ENCOUNTER — CLINICAL SUPPORT (OUTPATIENT)
Dept: REHABILITATION | Facility: HOSPITAL | Age: 64
End: 2024-06-28
Payer: COMMERCIAL

## 2024-06-28 DIAGNOSIS — M25.50 POLYARTHRALGIA: Primary | Chronic | ICD-10-CM

## 2024-06-28 DIAGNOSIS — M54.59 OTHER LOW BACK PAIN: ICD-10-CM

## 2024-06-28 PROCEDURE — 97811 ACUP 1/> W/O ESTIM EA ADD 15: CPT | Mod: PO

## 2024-06-28 PROCEDURE — 97810 ACUP 1/> WO ESTIM 1ST 15 MIN: CPT | Mod: PO

## 2024-06-29 NOTE — PROGRESS NOTES
Acupuncture Evaluation Note     Name: Shira Gloria  Clinic Number: [id number]    Traditional Chinese Medicine (TCM) Diagnosis: Qi Stagnation  Medical Diagnosis:   Encounter Diagnoses   Name Primary?    Polyarthralgia Yes    Other low back pain         Evaluation Date: 6/28/24    Visit #/Visits authorized: 3/12    Precautions: Standard    Subjective     Chief Concern: Polyarthralgia, chronic pain, various joints in chronic pain including shoulders/joints of neck/knees, etc.    Medical necessity is demonstrated by the following IMPAIRMENTS: Medical Necessity: Decreased mobility limits day to day activities, social, and emergent situations              Aggravating Factors:  movement and pressure   Relieving Factors:  rest    Symptom Description:     Quality:  Aching  Severity:  5  Frequency:  when active      Objective     Pulse:        wiry       Treatment       Acupuncture points used:    Bilateral points:K3, UB 62, SI4/3, TB 5, GB 20  Needles In: 10  Needles Out: 10  Needles W/O STIM placed: 3pm  [place] W/O STIM removed: 3:45pm        Assessment     After treatment, patient felt more relaxed and less strain    Patient prognosis is Good.     Patient will continue to benefit from acupuncture treatment to address the deficits listed in the problem list box on initial evaluation, provide patient family education and to maximize pt's level of independence in the home and community environment.     Patient's spiritual, cultural and educational needs considered and pt agreeable to plan of care and goals.     Anticipated barriers to treatment: none    Plan     Recommend every couple weeks for 12 sessions with midway re-assess.      Education:  Patient is aware of cumulative benefit of acupuncture

## 2024-07-09 ENCOUNTER — LAB VISIT (OUTPATIENT)
Dept: LAB | Facility: HOSPITAL | Age: 64
End: 2024-07-09
Attending: INTERNAL MEDICINE
Payer: COMMERCIAL

## 2024-07-09 DIAGNOSIS — M25.50 POLYARTHRALGIA: ICD-10-CM

## 2024-07-09 PROCEDURE — 86480 TB TEST CELL IMMUN MEASURE: CPT | Performed by: INTERNAL MEDICINE

## 2024-07-11 LAB
GAMMA INTERFERON BACKGROUND BLD IA-ACNC: 0 IU/ML
M TB IFN-G CD4+ BCKGRND COR BLD-ACNC: 0 IU/ML
M TB IFN-G CD4+ BCKGRND COR BLD-ACNC: 0 IU/ML
MITOGEN IGNF BCKGRD COR BLD-ACNC: 10 IU/ML
TB GOLD PLUS: NEGATIVE

## 2024-07-17 ENCOUNTER — OFFICE VISIT (OUTPATIENT)
Dept: RHEUMATOLOGY | Facility: CLINIC | Age: 64
End: 2024-07-17
Payer: COMMERCIAL

## 2024-07-17 VITALS
BODY MASS INDEX: 40.74 KG/M2 | DIASTOLIC BLOOD PRESSURE: 74 MMHG | WEIGHT: 215.63 LBS | SYSTOLIC BLOOD PRESSURE: 140 MMHG

## 2024-07-17 DIAGNOSIS — M06.9 RHEUMATOID ARTHRITIS FLARE: Primary | ICD-10-CM

## 2024-07-17 PROCEDURE — 99215 OFFICE O/P EST HI 40 MIN: CPT | Mod: S$GLB,,, | Performed by: INTERNAL MEDICINE

## 2024-07-17 PROCEDURE — 1159F MED LIST DOCD IN RCRD: CPT | Mod: CPTII,S$GLB,, | Performed by: INTERNAL MEDICINE

## 2024-07-17 PROCEDURE — 3008F BODY MASS INDEX DOCD: CPT | Mod: CPTII,S$GLB,, | Performed by: INTERNAL MEDICINE

## 2024-07-17 PROCEDURE — 99999 PR PBB SHADOW E&M-EST. PATIENT-LVL III: CPT | Mod: PBBFAC,,, | Performed by: INTERNAL MEDICINE

## 2024-07-17 PROCEDURE — 3077F SYST BP >= 140 MM HG: CPT | Mod: CPTII,S$GLB,, | Performed by: INTERNAL MEDICINE

## 2024-07-17 PROCEDURE — 3078F DIAST BP <80 MM HG: CPT | Mod: CPTII,S$GLB,, | Performed by: INTERNAL MEDICINE

## 2024-07-17 RX ORDER — FOLIC ACID 1 MG/1
1 TABLET ORAL DAILY
Qty: 30 TABLET | Refills: 11 | Status: SHIPPED | OUTPATIENT
Start: 2024-07-17 | End: 2025-07-17

## 2024-07-17 RX ORDER — METHOTREXATE 2.5 MG/1
TABLET ORAL
Qty: 32 TABLET | Refills: 0 | Status: SHIPPED | OUTPATIENT
Start: 2024-07-17

## 2024-07-17 NOTE — PROGRESS NOTES
Subjective:       Patient ID: Shira Gloria is a 61 y.o. female.    Chief Complaint: Disease Management    HPI  61 year old F with PMH of hypothyroidism, HTN, JAYDEN, nephrolithiasis here for evaluation.She has been having pain for 18 months.  She has pain in shoulders, wrists, hands, and hips.   Pain level can be as high as 8/10. She takes advil 2 pills daily.  She reports ice is most helpful. She used to take meloxicam and it did not help her pain. Tramadol does not help her pain.  Denies any hair loss, oral ulcers, fevers, or raynauds.    Denies taking steroids.  Family hx: negative for RA  Grandmother:SLE     Interval history: When she took prednisone, she felt much better.    She has pain in left jaw and right second finger.   She has one toe in right foot that is throbbing.   She is stiff for 30 minutes.      Review of Systems   Constitutional: Negative for activity change, appetite change, chills, diaphoresis, fatigue and fever.   HENT: Negative for congestion, ear discharge, ear pain, facial swelling, mouth sores, sinus pressure, sneezing, sore throat, tinnitus and trouble swallowing.    Eyes: Negative for photophobia, pain, discharge, redness, itching and visual disturbance.   Respiratory: Negative for apnea, cough, chest tightness, shortness of breath, wheezing and stridor.    Cardiovascular: Negative for chest pain and leg swelling.   Gastrointestinal: Negative for abdominal distention, abdominal pain, anal bleeding, blood in stool, constipation, diarrhea and nausea.   Endocrine: Negative for cold intolerance and heat intolerance.   Genitourinary: Negative for difficulty urinating, dysuria and genital sores.   Musculoskeletal: Positive for arthralgias and joint swelling. Negative for back pain, gait problem, myalgias, neck pain and neck stiffness.   Skin: Negative for color change, pallor, rash and wound.   Neurological: Negative for dizziness, seizures, light-headedness, numbness and headaches.    Hematological: Negative for adenopathy. Does not bruise/bleed easily.   Psychiatric/Behavioral: Negative for sleep disturbance. The patient is not nervous/anxious.            Objective:     Physical Exam   Constitutional: She is oriented to person, place, and time.   HENT:   Head: Normocephalic and atraumatic.   Right Ear: External ear normal.   Left Ear: External ear normal.   Nose: Nose normal.   Mouth/Throat: Oropharynx is clear and moist. No oropharyngeal exudate.   Eyes: Pupils are equal, round, and reactive to light. Conjunctivae are normal. Right eye exhibits no discharge. Left eye exhibits no discharge. No scleral icterus.   Neck: No JVD present. No thyromegaly present.   Cardiovascular: Normal rate, regular rhythm and normal heart sounds. Exam reveals no gallop and no friction rub.   No murmur heard.  Pulmonary/Chest: Effort normal and breath sounds normal. No respiratory distress. She has no wheezes. She has no rales. She exhibits no tenderness.   Abdominal: Soft. Bowel sounds are normal. She exhibits no distension and no mass. There is no abdominal tenderness. There is no rebound and no guarding.   Musculoskeletal:         General: No swelling, tenderness, deformity or signs of injury.      Cervical back: Neck supple.   Lymphadenopathy:     She has no cervical adenopathy.   Neurological: She is alert and oriented to person, place, and time. No cranial nerve deficit. Gait normal. Coordination normal.   Skin: Skin is dry. No rash noted. No erythema. No pallor.   Psychiatric: Affect and judgment normal.        No data to display     Assessment:      63 year old F with PMH of hypothyroidism, HTN, JAYDEN, nephrolithiasis here for evaluation of polyarthralgias.  She has +RF and +CCP and reports bilateral symmetrical joint pain which is consistent with seropositive RA.  She is flaring so will start MTX.    1. Arthralgia of both hands            Plan:   Start MTX 4 pills once a week for the first 2 weeks and then  increase to 6 pills once a week  (Risks and benefits of initiating MTX discussed. Patient aware that risks include and not limited to lung toxicity, liver abnormalities, cell count abnormalities, malignancy, and allergic  reaction to medication. Patient agrees with starting medication.  Start folic acid 1mg po day  Labs in 4 weeks    40 * minutes of total time spent on the encounter, which includes face to face time and non-face to face time preparing to see the patient (eg, review of tests), Obtaining and/or reviewing separately obtained history, Documenting clinical information in the electronic or other health record, Independently interpreting results (not separately reported) and communicating results to the patient/family/caregiver, or Care coordination (not separately reported).

## 2024-07-26 ENCOUNTER — CLINICAL SUPPORT (OUTPATIENT)
Dept: REHABILITATION | Facility: HOSPITAL | Age: 64
End: 2024-07-26
Payer: COMMERCIAL

## 2024-07-26 DIAGNOSIS — M54.59 OTHER LOW BACK PAIN: ICD-10-CM

## 2024-07-26 DIAGNOSIS — M25.50 POLYARTHRALGIA: Primary | ICD-10-CM

## 2024-07-26 PROCEDURE — 97811 ACUP 1/> W/O ESTIM EA ADD 15: CPT | Mod: PO

## 2024-07-26 PROCEDURE — 97810 ACUP 1/> WO ESTIM 1ST 15 MIN: CPT | Mod: PO

## 2024-07-27 NOTE — PROGRESS NOTES
Acupuncture Evaluation Note     Name: Shira Gloria  Clinic Number: [id number]    Traditional Chinese Medicine (TCM) Diagnosis: Qi Stagnation  Medical Diagnosis:   Encounter Diagnoses   Name Primary?    Polyarthralgia Yes    Other low back pain         Evaluation Date: 7/26/24    Visit #/Visits authorized: 4/12    Precautions: Standard    Subjective     Chief Concern: Polyarthralgia, chronic pain, various joints in chronic pain including shoulders/joints of neck/knees, etc.    Medical necessity is demonstrated by the following IMPAIRMENTS: Medical Necessity: Decreased mobility limits day to day activities, social, and emergent situations              Aggravating Factors:  movement and pressure   Relieving Factors:  rest    Symptom Description:     Quality:  Aching  Severity:  5  Frequency:  when active      Objective     Pulse:        wiry       Treatment       Acupuncture points used:    Bilateral points:K3, UB 62, SI4/3, TB 5, GB 20  Needles In: 10  Needles Out: 10  Needles W/O STIM placed: 10:30am  Needles W/O STIM removed: 11:15am        Assessment     After treatment, patient felt more relaxed and less strain    Patient prognosis is Good.     Patient will continue to benefit from acupuncture treatment to address the deficits listed in the problem list box on initial evaluation, provide patient family education and to maximize pt's level of independence in the home and community environment.     Patient's spiritual, cultural and educational needs considered and pt agreeable to plan of care and goals.     Anticipated barriers to treatment: none    Plan     Recommend every couple weeks for 12 sessions with midway re-assess.      Education:  Patient is aware of cumulative benefit of acupuncture

## 2024-08-10 ENCOUNTER — PATIENT MESSAGE (OUTPATIENT)
Dept: RHEUMATOLOGY | Facility: CLINIC | Age: 64
End: 2024-08-10
Payer: COMMERCIAL

## 2024-08-17 DIAGNOSIS — I10 HYPERTENSION, ESSENTIAL: Primary | ICD-10-CM

## 2024-08-17 DIAGNOSIS — E03.4 HYPOTHYROIDISM DUE TO ACQUIRED ATROPHY OF THYROID: ICD-10-CM

## 2024-08-19 RX ORDER — DILTIAZEM HYDROCHLORIDE 240 MG/1
240 CAPSULE, COATED, EXTENDED RELEASE ORAL
Qty: 90 CAPSULE | Refills: 1 | Status: SHIPPED | OUTPATIENT
Start: 2024-08-19

## 2024-08-19 RX ORDER — LEVOTHYROXINE SODIUM 88 UG/1
88 TABLET ORAL
Qty: 90 TABLET | Refills: 1 | Status: SHIPPED | OUTPATIENT
Start: 2024-08-19

## 2024-08-21 ENCOUNTER — LAB VISIT (OUTPATIENT)
Dept: LAB | Facility: HOSPITAL | Age: 64
End: 2024-08-21
Attending: INTERNAL MEDICINE
Payer: COMMERCIAL

## 2024-08-21 DIAGNOSIS — M06.9 RHEUMATOID ARTHRITIS FLARE: ICD-10-CM

## 2024-08-21 LAB
ALBUMIN SERPL BCP-MCNC: 3.7 G/DL (ref 3.5–5.2)
ALP SERPL-CCNC: 157 U/L (ref 55–135)
ALT SERPL W/O P-5'-P-CCNC: 39 U/L (ref 10–44)
ANION GAP SERPL CALC-SCNC: 7 MMOL/L (ref 8–16)
AST SERPL-CCNC: 38 U/L (ref 10–40)
BASOPHILS # BLD AUTO: 0.08 K/UL (ref 0–0.2)
BASOPHILS NFR BLD: 1.1 % (ref 0–1.9)
BILIRUB SERPL-MCNC: 0.9 MG/DL (ref 0.1–1)
BUN SERPL-MCNC: 21 MG/DL (ref 8–23)
CALCIUM SERPL-MCNC: 9.3 MG/DL (ref 8.7–10.5)
CHLORIDE SERPL-SCNC: 109 MMOL/L (ref 95–110)
CO2 SERPL-SCNC: 24 MMOL/L (ref 23–29)
CREAT SERPL-MCNC: 0.8 MG/DL (ref 0.5–1.4)
CRP SERPL-MCNC: 5.7 MG/L (ref 0–8.2)
DIFFERENTIAL METHOD BLD: ABNORMAL
EOSINOPHIL # BLD AUTO: 0.4 K/UL (ref 0–0.5)
EOSINOPHIL NFR BLD: 5.1 % (ref 0–8)
ERYTHROCYTE [DISTWIDTH] IN BLOOD BY AUTOMATED COUNT: 14.1 % (ref 11.5–14.5)
ERYTHROCYTE [SEDIMENTATION RATE] IN BLOOD BY PHOTOMETRIC METHOD: 28 MM/HR (ref 0–36)
EST. GFR  (NO RACE VARIABLE): >60 ML/MIN/1.73 M^2
GLUCOSE SERPL-MCNC: 101 MG/DL (ref 70–110)
HCT VFR BLD AUTO: 40.6 % (ref 37–48.5)
HGB BLD-MCNC: 14.1 G/DL (ref 12–16)
IMM GRANULOCYTES # BLD AUTO: 0.01 K/UL (ref 0–0.04)
IMM GRANULOCYTES NFR BLD AUTO: 0.1 % (ref 0–0.5)
LYMPHOCYTES # BLD AUTO: 2.1 K/UL (ref 1–4.8)
LYMPHOCYTES NFR BLD: 29.6 % (ref 18–48)
MCH RBC QN AUTO: 31.5 PG (ref 27–31)
MCHC RBC AUTO-ENTMCNC: 34.7 G/DL (ref 32–36)
MCV RBC AUTO: 91 FL (ref 82–98)
MONOCYTES # BLD AUTO: 0.7 K/UL (ref 0.3–1)
MONOCYTES NFR BLD: 9.8 % (ref 4–15)
NEUTROPHILS # BLD AUTO: 3.8 K/UL (ref 1.8–7.7)
NEUTROPHILS NFR BLD: 54.3 % (ref 38–73)
NRBC BLD-RTO: 0 /100 WBC
PLATELET # BLD AUTO: 254 K/UL (ref 150–450)
PMV BLD AUTO: 11.1 FL (ref 9.2–12.9)
POTASSIUM SERPL-SCNC: 3.7 MMOL/L (ref 3.5–5.1)
PROT SERPL-MCNC: 6.9 G/DL (ref 6–8.4)
RBC # BLD AUTO: 4.47 M/UL (ref 4–5.4)
SODIUM SERPL-SCNC: 140 MMOL/L (ref 136–145)
WBC # BLD AUTO: 7.05 K/UL (ref 3.9–12.7)

## 2024-08-21 PROCEDURE — 86140 C-REACTIVE PROTEIN: CPT | Performed by: INTERNAL MEDICINE

## 2024-08-21 PROCEDURE — 85652 RBC SED RATE AUTOMATED: CPT | Performed by: INTERNAL MEDICINE

## 2024-08-21 PROCEDURE — 85025 COMPLETE CBC W/AUTO DIFF WBC: CPT | Performed by: INTERNAL MEDICINE

## 2024-08-21 PROCEDURE — 36415 COLL VENOUS BLD VENIPUNCTURE: CPT | Mod: PO | Performed by: INTERNAL MEDICINE

## 2024-08-21 PROCEDURE — 80053 COMPREHEN METABOLIC PANEL: CPT | Performed by: INTERNAL MEDICINE

## 2024-08-22 ENCOUNTER — PATIENT MESSAGE (OUTPATIENT)
Dept: RHEUMATOLOGY | Facility: CLINIC | Age: 64
End: 2024-08-22
Payer: COMMERCIAL

## 2024-08-22 ENCOUNTER — CLINICAL SUPPORT (OUTPATIENT)
Facility: HOSPITAL | Age: 64
End: 2024-08-22
Payer: COMMERCIAL

## 2024-08-22 DIAGNOSIS — M25.50 POLYARTHRALGIA: Primary | ICD-10-CM

## 2024-08-22 PROCEDURE — 97811 ACUP 1/> W/O ESTIM EA ADD 15: CPT | Mod: PO

## 2024-08-22 PROCEDURE — 97810 ACUP 1/> WO ESTIM 1ST 15 MIN: CPT | Mod: PO

## 2024-08-23 DIAGNOSIS — M06.9 RHEUMATOID ARTHRITIS FLARE: Primary | ICD-10-CM

## 2024-08-23 RX ORDER — METHOTREXATE 2.5 MG/1
20 TABLET ORAL
Qty: 96 TABLET | Refills: 0 | Status: SHIPPED | OUTPATIENT
Start: 2024-08-23 | End: 2025-08-23

## 2024-08-23 NOTE — PROGRESS NOTES
Acupuncture Evaluation Note     Name: Shira Gloria  Clinic Number: [id number]    Traditional Chinese Medicine (TCM) Diagnosis: Qi Stagnation  Medical Diagnosis:   Encounter Diagnosis   Name Primary?    Polyarthralgia Yes        Evaluation Date: 8/22/24    Visit #/Visits authorized: 5/12    Precautions: Standard    Subjective     Chief Concern: Polyarthralgia, chronic pain, various joints in chronic pain including shoulders/joints of neck/knees, etc.    Medical necessity is demonstrated by the following IMPAIRMENTS: Medical Necessity: Decreased mobility limits day to day activities, social, and emergent situations              Aggravating Factors:  movement and pressure   Relieving Factors:  rest    Symptom Description:     Quality:  Aching  Severity:  5  Frequency:  when active      Objective     Pulse:        wiry       Treatment       Acupuncture points used: K3, UB 62, SI4/3, TB 5, GB 20  Needles In: 10  Needles Out: 10  [place] W/O STIM placed:3:30pm  [place] W/O STIM removed: 4pm      Assessment     After treatment, patient felt more relaxed and less strain    Patient prognosis is Good.     Patient will continue to benefit from acupuncture treatment to address the deficits listed in the problem list box on initial evaluation, provide patient family education and to maximize pt's level of independence in the home and community environment.     Patient's spiritual, cultural and educational needs considered and pt agreeable to plan of care and goals.     Anticipated barriers to treatment: none    Plan     Recommend every couple weeks for 12 sessions with midway re-assess.      Education:  Patient is aware of cumulative benefit of acupuncture

## 2024-08-26 DIAGNOSIS — M06.9 RHEUMATOID ARTHRITIS FLARE: ICD-10-CM

## 2024-08-26 RX ORDER — GABAPENTIN 300 MG/1
300 CAPSULE ORAL 3 TIMES DAILY
Qty: 90 CAPSULE | Refills: 11 | Status: SHIPPED | OUTPATIENT
Start: 2024-08-26 | End: 2025-08-26

## 2024-08-26 RX ORDER — PREDNISONE 20 MG/1
20 TABLET ORAL DAILY
Qty: 10 TABLET | Refills: 2 | Status: SHIPPED | OUTPATIENT
Start: 2024-08-26

## 2024-08-26 RX ORDER — METHOTREXATE 2.5 MG/1
TABLET ORAL
Qty: 32 TABLET | Refills: 0 | OUTPATIENT
Start: 2024-08-26

## 2024-08-26 RX ORDER — PREDNISONE 10 MG/1
TABLET ORAL
Qty: 60 TABLET | Refills: 1 | Status: SHIPPED | OUTPATIENT
Start: 2024-08-26

## 2024-09-19 ENCOUNTER — CLINICAL SUPPORT (OUTPATIENT)
Facility: HOSPITAL | Age: 64
End: 2024-09-19
Payer: COMMERCIAL

## 2024-09-19 DIAGNOSIS — M25.50 POLYARTHRALGIA: Primary | Chronic | ICD-10-CM

## 2024-09-19 PROCEDURE — 97810 ACUP 1/> WO ESTIM 1ST 15 MIN: CPT | Mod: PO

## 2024-09-19 PROCEDURE — 97811 ACUP 1/> W/O ESTIM EA ADD 15: CPT | Mod: PO

## 2024-09-20 NOTE — PROGRESS NOTES
Acupuncture Evaluation Note     Name: Shira Gloria  Clinic Number: [id number]    Traditional Chinese Medicine (TCM) Diagnosis: Qi Stagnation  Medical Diagnosis:   Encounter Diagnosis   Name Primary?    Polyarthralgia Yes        Evaluation Date: 9/19/24    Visit #/Visits authorized: 6/12    Precautions: Standard    Subjective     Chief Concern: Polyarthralgia, chronic pain, various joints in chronic pain including shoulders/joints of neck/knees, etc.    Medical necessity is demonstrated by the following IMPAIRMENTS: Medical Necessity: Decreased mobility limits day to day activities, social, and emergent situations              Aggravating Factors:  movement and pressure   Relieving Factors:  rest    Symptom Description:     Quality:  Aching  Severity:  5  Frequency:  when active      Objective     Pulse:        wiry       Treatment       Acupuncture points used: K3, UB 62, SI4/3, TB 5, GB 20  Needles In: 10  Needles Out: 10  [place] W/O STIM placed:3pm  [place] W/O STIM removed: 3:30pm      Assessment     After treatment, patient felt more relaxed and less strain    Patient prognosis is Good.     Patient will continue to benefit from acupuncture treatment to address the deficits listed in the problem list box on initial evaluation, provide patient family education and to maximize pt's level of independence in the home and community environment.     Patient's spiritual, cultural and educational needs considered and pt agreeable to plan of care and goals.     Anticipated barriers to treatment: none    Plan     Recommend every couple weeks for 12 sessions with midway re-assess.      Education:  Patient is aware of cumulative benefit of acupuncture

## 2024-09-24 PROBLEM — Z79.899 DRUG-INDUCED IMMUNODEFICIENCY: Status: ACTIVE | Noted: 2024-09-24

## 2024-09-24 PROBLEM — D84.821 DRUG-INDUCED IMMUNODEFICIENCY: Status: ACTIVE | Noted: 2024-09-24

## 2024-09-27 ENCOUNTER — LAB VISIT (OUTPATIENT)
Dept: LAB | Facility: HOSPITAL | Age: 64
End: 2024-09-27
Attending: INTERNAL MEDICINE
Payer: COMMERCIAL

## 2024-09-27 DIAGNOSIS — M06.9 RHEUMATOID ARTHRITIS FLARE: ICD-10-CM

## 2024-09-27 LAB
ALBUMIN SERPL BCP-MCNC: 3.7 G/DL (ref 3.5–5.2)
ALP SERPL-CCNC: 148 U/L (ref 55–135)
ALT SERPL W/O P-5'-P-CCNC: 20 U/L (ref 10–44)
ANION GAP SERPL CALC-SCNC: 10 MMOL/L (ref 8–16)
AST SERPL-CCNC: 25 U/L (ref 10–40)
BASOPHILS # BLD AUTO: 0.1 K/UL (ref 0–0.2)
BASOPHILS NFR BLD: 1.3 % (ref 0–1.9)
BILIRUB SERPL-MCNC: 0.8 MG/DL (ref 0.1–1)
BUN SERPL-MCNC: 17 MG/DL (ref 8–23)
CALCIUM SERPL-MCNC: 9.2 MG/DL (ref 8.7–10.5)
CHLORIDE SERPL-SCNC: 108 MMOL/L (ref 95–110)
CO2 SERPL-SCNC: 23 MMOL/L (ref 23–29)
CREAT SERPL-MCNC: 0.8 MG/DL (ref 0.5–1.4)
CRP SERPL-MCNC: 4.2 MG/L (ref 0–8.2)
DIFFERENTIAL METHOD BLD: ABNORMAL
EOSINOPHIL # BLD AUTO: 0.4 K/UL (ref 0–0.5)
EOSINOPHIL NFR BLD: 5 % (ref 0–8)
ERYTHROCYTE [DISTWIDTH] IN BLOOD BY AUTOMATED COUNT: 14 % (ref 11.5–14.5)
ERYTHROCYTE [SEDIMENTATION RATE] IN BLOOD BY PHOTOMETRIC METHOD: 25 MM/HR (ref 0–36)
EST. GFR  (NO RACE VARIABLE): >60 ML/MIN/1.73 M^2
GLUCOSE SERPL-MCNC: 75 MG/DL (ref 70–110)
HCT VFR BLD AUTO: 42.4 % (ref 37–48.5)
HGB BLD-MCNC: 14.1 G/DL (ref 12–16)
IMM GRANULOCYTES # BLD AUTO: 0.05 K/UL (ref 0–0.04)
IMM GRANULOCYTES NFR BLD AUTO: 0.7 % (ref 0–0.5)
LYMPHOCYTES # BLD AUTO: 2.2 K/UL (ref 1–4.8)
LYMPHOCYTES NFR BLD: 28.3 % (ref 18–48)
MCH RBC QN AUTO: 30.8 PG (ref 27–31)
MCHC RBC AUTO-ENTMCNC: 33.3 G/DL (ref 32–36)
MCV RBC AUTO: 93 FL (ref 82–98)
MONOCYTES # BLD AUTO: 0.9 K/UL (ref 0.3–1)
MONOCYTES NFR BLD: 11.4 % (ref 4–15)
NEUTROPHILS # BLD AUTO: 4.1 K/UL (ref 1.8–7.7)
NEUTROPHILS NFR BLD: 53.3 % (ref 38–73)
NRBC BLD-RTO: 0 /100 WBC
PLATELET # BLD AUTO: 311 K/UL (ref 150–450)
PMV BLD AUTO: 10.4 FL (ref 9.2–12.9)
POTASSIUM SERPL-SCNC: 3.4 MMOL/L (ref 3.5–5.1)
PROT SERPL-MCNC: 6.8 G/DL (ref 6–8.4)
RBC # BLD AUTO: 4.58 M/UL (ref 4–5.4)
SODIUM SERPL-SCNC: 141 MMOL/L (ref 136–145)
WBC # BLD AUTO: 7.63 K/UL (ref 3.9–12.7)

## 2024-09-27 PROCEDURE — 85652 RBC SED RATE AUTOMATED: CPT | Performed by: INTERNAL MEDICINE

## 2024-09-27 PROCEDURE — 85025 COMPLETE CBC W/AUTO DIFF WBC: CPT | Performed by: INTERNAL MEDICINE

## 2024-09-27 PROCEDURE — 86140 C-REACTIVE PROTEIN: CPT | Performed by: INTERNAL MEDICINE

## 2024-09-27 PROCEDURE — 36415 COLL VENOUS BLD VENIPUNCTURE: CPT | Mod: PO | Performed by: INTERNAL MEDICINE

## 2024-09-27 PROCEDURE — 80053 COMPREHEN METABOLIC PANEL: CPT | Performed by: INTERNAL MEDICINE

## 2024-10-18 ENCOUNTER — PATIENT MESSAGE (OUTPATIENT)
Facility: HOSPITAL | Age: 64
End: 2024-10-18
Payer: COMMERCIAL

## 2024-10-31 ENCOUNTER — CLINICAL SUPPORT (OUTPATIENT)
Dept: REHABILITATION | Facility: HOSPITAL | Age: 64
End: 2024-10-31
Payer: COMMERCIAL

## 2024-10-31 DIAGNOSIS — M25.50 POLYARTHRALGIA: Primary | ICD-10-CM

## 2024-10-31 PROCEDURE — 97811 ACUP 1/> W/O ESTIM EA ADD 15: CPT | Mod: PO

## 2024-10-31 PROCEDURE — 97810 ACUP 1/> WO ESTIM 1ST 15 MIN: CPT | Mod: PO

## 2024-11-14 ENCOUNTER — CLINICAL SUPPORT (OUTPATIENT)
Dept: REHABILITATION | Facility: HOSPITAL | Age: 64
End: 2024-11-14
Payer: COMMERCIAL

## 2024-11-14 DIAGNOSIS — M25.50 POLYARTHRALGIA: Primary | ICD-10-CM

## 2024-11-14 PROCEDURE — 97811 ACUP 1/> W/O ESTIM EA ADD 15: CPT | Mod: PO

## 2024-11-14 PROCEDURE — 97810 ACUP 1/> WO ESTIM 1ST 15 MIN: CPT | Mod: PO

## 2024-11-15 NOTE — PROGRESS NOTES
Acupuncture Evaluation Note     Name: Shira Gloria  Clinic Number: [id number]    Traditional Chinese Medicine (TCM) Diagnosis: Qi Stagnation  Medical Diagnosis:   Encounter Diagnosis   Name Primary?    Polyarthralgia Yes        Evaluation Date: 11/14/24    Visit #/Visits authorized: 8/12    Precautions: Standard    Subjective     Chief Concern: Polyarthralgia, chronic pain, various joints in chronic pain including shoulders/joints of neck/knees, etc.    Medical necessity is demonstrated by the following IMPAIRMENTS: Medical Necessity: Decreased mobility limits day to day activities, social, and emergent situations              Aggravating Factors:  movement and pressure   Relieving Factors:  rest    Symptom Description:     Quality:  Aching  Severity:  5  Frequency:  when active      Objective     Pulse:        wiry       Treatment       Acupuncture points used: K3, UB 62, SI4/3, TB 5, GB 20  Needles In: 10  Needles Out: 10  [place] W/O STIM placed:3pm  [place] W/O STIM removed: 3:30pm      Assessment     After treatment, patient felt more relaxed and less strain    Patient prognosis is Good.     Patient will continue to benefit from acupuncture treatment to address the deficits listed in the problem list box on initial evaluation, provide patient family education and to maximize pt's level of independence in the home and community environment.     Patient's spiritual, cultural and educational needs considered and pt agreeable to plan of care and goals.     Anticipated barriers to treatment: none    Plan     Recommend every couple weeks for 12 sessions with midway re-assess.      Education:  Patient is aware of cumulative benefit of acupuncture

## 2024-11-17 DIAGNOSIS — M06.9 RHEUMATOID ARTHRITIS FLARE: ICD-10-CM

## 2024-11-18 RX ORDER — METHOTREXATE 2.5 MG/1
20 TABLET ORAL
Qty: 96 TABLET | Refills: 0 | Status: SHIPPED | OUTPATIENT
Start: 2024-11-18 | End: 2025-11-18

## 2024-12-09 ENCOUNTER — PATIENT MESSAGE (OUTPATIENT)
Dept: FAMILY MEDICINE | Facility: CLINIC | Age: 64
End: 2024-12-09
Payer: COMMERCIAL

## 2024-12-09 RX ORDER — NITROFURANTOIN 25; 75 MG/1; MG/1
100 CAPSULE ORAL 2 TIMES DAILY
Qty: 14 CAPSULE | Refills: 0 | Status: SHIPPED | OUTPATIENT
Start: 2024-12-09

## 2024-12-27 DIAGNOSIS — M06.9 RHEUMATOID ARTHRITIS INVOLVING MULTIPLE JOINTS: Primary | ICD-10-CM

## 2024-12-27 DIAGNOSIS — M25.50 POLYARTHRALGIA: ICD-10-CM

## 2025-02-11 DIAGNOSIS — I10 HYPERTENSION, ESSENTIAL: ICD-10-CM

## 2025-02-11 DIAGNOSIS — E03.4 HYPOTHYROIDISM DUE TO ACQUIRED ATROPHY OF THYROID: ICD-10-CM

## 2025-02-13 RX ORDER — LEVOTHYROXINE SODIUM 88 UG/1
88 TABLET ORAL
Qty: 90 TABLET | Refills: 1 | Status: SHIPPED | OUTPATIENT
Start: 2025-02-13

## 2025-02-13 RX ORDER — DILTIAZEM HYDROCHLORIDE 240 MG/1
240 CAPSULE, COATED, EXTENDED RELEASE ORAL DAILY
Qty: 90 CAPSULE | Refills: 1 | Status: SHIPPED | OUTPATIENT
Start: 2025-02-13

## 2025-02-16 DIAGNOSIS — M06.9 RHEUMATOID ARTHRITIS FLARE: ICD-10-CM

## 2025-02-18 ENCOUNTER — PATIENT MESSAGE (OUTPATIENT)
Dept: RHEUMATOLOGY | Facility: CLINIC | Age: 65
End: 2025-02-18
Payer: COMMERCIAL

## 2025-02-18 DIAGNOSIS — M06.9 RHEUMATOID ARTHRITIS INVOLVING MULTIPLE JOINTS: Primary | ICD-10-CM

## 2025-02-18 RX ORDER — METHOTREXATE 2.5 MG/1
20 TABLET ORAL
Qty: 96 TABLET | Refills: 0 | Status: SHIPPED | OUTPATIENT
Start: 2025-02-18 | End: 2026-02-18

## 2025-02-19 ENCOUNTER — OFFICE VISIT (OUTPATIENT)
Dept: FAMILY MEDICINE | Facility: CLINIC | Age: 65
End: 2025-02-19
Payer: COMMERCIAL

## 2025-02-19 VITALS
BODY MASS INDEX: 39.5 KG/M2 | TEMPERATURE: 98 F | WEIGHT: 209.19 LBS | HEIGHT: 61 IN | DIASTOLIC BLOOD PRESSURE: 88 MMHG | HEART RATE: 70 BPM | OXYGEN SATURATION: 99 % | SYSTOLIC BLOOD PRESSURE: 114 MMHG | RESPIRATION RATE: 17 BRPM

## 2025-02-19 DIAGNOSIS — D84.821 DRUG-INDUCED IMMUNODEFICIENCY: ICD-10-CM

## 2025-02-19 DIAGNOSIS — G47.33 OSA ON CPAP: ICD-10-CM

## 2025-02-19 DIAGNOSIS — Z79.899 DRUG-INDUCED IMMUNODEFICIENCY: ICD-10-CM

## 2025-02-19 DIAGNOSIS — M05.741 RHEUMATOID ARTHRITIS INVOLVING BOTH HANDS WITH POSITIVE RHEUMATOID FACTOR: ICD-10-CM

## 2025-02-19 DIAGNOSIS — I10 HYPERTENSION, ESSENTIAL: Primary | ICD-10-CM

## 2025-02-19 DIAGNOSIS — E03.4 HYPOTHYROIDISM DUE TO ACQUIRED ATROPHY OF THYROID: ICD-10-CM

## 2025-02-19 DIAGNOSIS — E66.813 CLASS 3 OBESITY: ICD-10-CM

## 2025-02-19 DIAGNOSIS — M05.742 RHEUMATOID ARTHRITIS INVOLVING BOTH HANDS WITH POSITIVE RHEUMATOID FACTOR: ICD-10-CM

## 2025-02-19 PROCEDURE — 3008F BODY MASS INDEX DOCD: CPT | Mod: CPTII,S$GLB,, | Performed by: NURSE PRACTITIONER

## 2025-02-19 PROCEDURE — 1159F MED LIST DOCD IN RCRD: CPT | Mod: CPTII,S$GLB,, | Performed by: NURSE PRACTITIONER

## 2025-02-19 PROCEDURE — 3074F SYST BP LT 130 MM HG: CPT | Mod: CPTII,S$GLB,, | Performed by: NURSE PRACTITIONER

## 2025-02-19 PROCEDURE — 99213 OFFICE O/P EST LOW 20 MIN: CPT | Mod: S$GLB,,, | Performed by: NURSE PRACTITIONER

## 2025-02-19 PROCEDURE — 3079F DIAST BP 80-89 MM HG: CPT | Mod: CPTII,S$GLB,, | Performed by: NURSE PRACTITIONER

## 2025-02-19 PROCEDURE — 1160F RVW MEDS BY RX/DR IN RCRD: CPT | Mod: CPTII,S$GLB,, | Performed by: NURSE PRACTITIONER

## 2025-02-19 RX ORDER — HYDROCHLOROTHIAZIDE 12.5 MG/1
12.5 TABLET ORAL DAILY PRN
Qty: 90 TABLET | Refills: 1 | Status: SHIPPED | OUTPATIENT
Start: 2025-02-19 | End: 2026-02-19

## 2025-02-19 NOTE — PROGRESS NOTES
Answers submitted by the patient for this visit:  Review of Systems Questionnaire (Submitted on 2/12/2025)  activity change: No  unexpected weight change: No  neck pain: No  hearing loss: No  rhinorrhea: No  trouble swallowing: No  eye discharge: No  visual disturbance: No  chest tightness: No  wheezing: No  chest pain: No  palpitations: No  blood in stool: No  constipation: No  vomiting: No  diarrhea: No  polydipsia: No  polyuria: No  difficulty urinating: No  hematuria: No  menstrual problem: No  dysuria: No  joint swelling: No  arthralgias: No  headaches: No  weakness: No  confusion: No  dysphoric mood: No  Subjective:       Patient ID: Shira Gloria is a 64 y.o. female.    Chief Complaint: Follow-up    Follow-up  Associated symptoms include arthralgias. Pertinent negatives include no abdominal pain, chest pain, coughing, fatigue, headaches, joint swelling, nausea, neck pain, vomiting or weakness.     Here for follow up. Due for labs. Doing well at this time.     Rheumatology: Most recent note reviewed. Doing well on Methotrexate. Taking with Folic acid. Most recent note reviewed.     Sleep apnea: she is compliant with Cpap use.     She has several chronic issues to review.     See ROS    The following portion of the patients history was reviewed and updated as appropriate: allergies, current medications, past medical and surgical history. Past social history and problem list reviewed. Family PMH and Past social history reviewed. Tobacco, Illicit drug use reviewed.      Review of patient's allergies indicates:   Allergen Reactions    Cranberry Rash       Current Medications[1]    Past Medical History:   Diagnosis Date    HTN (hypertension)     Hypothyroidism     Morbid obesity with BMI of 40.0-44.9, adult        Past Surgical History:   Procedure Laterality Date    CARPAL TUNNEL RELEASE         Social History[2]    Review of Systems   Constitutional:  Negative for activity change, fatigue and unexpected weight  "change.   HENT:  Negative for hearing loss, rhinorrhea and trouble swallowing.    Eyes:  Negative for discharge and visual disturbance.   Respiratory:  Negative for cough, chest tightness, shortness of breath and wheezing.    Cardiovascular:  Negative for chest pain and palpitations.   Gastrointestinal:  Negative for abdominal pain, blood in stool, constipation, diarrhea, nausea and vomiting.   Genitourinary:  Negative for difficulty urinating, dysuria, hematuria and menstrual problem.   Musculoskeletal:  Positive for arthralgias. Negative for back pain, joint swelling and neck pain.   Neurological:  Negative for weakness and headaches.   Psychiatric/Behavioral:  Negative for confusion, dysphoric mood and sleep disturbance. The patient is not nervous/anxious.          Objective:      /88 (BP Location: Right arm, Patient Position: Sitting)   Pulse 70   Temp 97.5 °F (36.4 °C) (Temporal)   Resp 17   Ht 5' 1" (1.549 m)   Wt 94.9 kg (209 lb 3.5 oz)   SpO2 99%   BMI 39.53 kg/m²      Physical Exam  Constitutional:       Appearance: Normal appearance. She is obese.   HENT:      Head: Normocephalic.   Eyes:      Pupils: Pupils are equal, round, and reactive to light.   Neck:      Thyroid: No thyromegaly.      Vascular: No carotid bruit.   Cardiovascular:      Rate and Rhythm: Normal rate and regular rhythm.      Pulses: Normal pulses.      Heart sounds: Normal heart sounds. No murmur heard.  Pulmonary:      Effort: Pulmonary effort is normal.      Breath sounds: Normal breath sounds. No wheezing.   Abdominal:      General: Bowel sounds are normal.      Tenderness: There is no abdominal tenderness.   Musculoskeletal:         General: Normal range of motion.      Cervical back: Normal range of motion.      Right lower leg: No edema.      Left lower leg: No edema.      Comments: Gait normal.  strong, equal   Skin:     General: Skin is warm and dry.      Capillary Refill: Capillary refill takes less than 2 " seconds.   Neurological:      General: No focal deficit present.      Mental Status: She is alert.   Psychiatric:         Attention and Perception: Attention and perception normal.         Mood and Affect: Mood and affect normal.         Speech: Speech normal.         Behavior: Behavior normal.         Assessment:       1. Hypertension, essential    2. Hypothyroidism due to acquired atrophy of thyroid    3. Class 3 obesity    4. JAYDEN on CPAP    5. Drug-induced immunodeficiency    6. Rheumatoid arthritis involving both hands with positive rheumatoid factor        Plan:       Hypertension, essential: good BP control on current medications.   -     Lipid Panel; Future; Expected date: 02/19/2025    Hypothyroidism due to acquired atrophy of thyroid: Will adjust dose of thyroid medication if needed based on lab results  -     TSH; Future; Expected date: 02/19/2025    Class 3 obesity: Weight loss encouraged. Follow low fat, low carb diet. Portion control, exercise.     JAYDEN on CPAP: compliant with Cpap use    Drug-induced immunodeficiency: she is aware of precautions related to Immunodeficiency.     Rheumatoid arthritis involving both hands with positive rheumatoid factor: stable.     Other orders  -     hydroCHLOROthiazide 12.5 MG Tab; Take 1 tablet (12.5 mg total) by mouth daily as needed (edema).  Dispense: 90 tablet; Refill: 1       Continue current medication  Take medications only as prescribed  Healthy diet, exercise  Adequate rest  Adequate hydration  Avoid allergens  Avoid excessive caffeine     Follow up 6 months         [1]   Current Outpatient Medications:     azelastine (OPTIVAR) 0.05 % ophthalmic solution, INSTILL 1 DROP IN LEFT EYE TWICE DAILY FOR 7 DAYS, Disp: 18 mL, Rfl: 0    diltiaZEM (CARDIZEM CD) 240 MG 24 hr capsule, Take 1 capsule (240 mg total) by mouth once daily., Disp: 90 capsule, Rfl: 1    folic acid (FOLVITE) 1 MG tablet, Take 1 tablet (1 mg total) by mouth once daily., Disp: 30 tablet, Rfl: 11     levothyroxine (SYNTHROID) 88 MCG tablet, Take 1 tablet (88 mcg total) by mouth before breakfast., Disp: 90 tablet, Rfl: 1    methotrexate 2.5 MG Tab, Take 8 tablets (20 mg total) by mouth every 7 days., Disp: 96 tablet, Rfl: 0    nitrofurantoin, macrocrystal-monohydrate, (MACROBID) 100 MG capsule, Take 1 capsule (100 mg total) by mouth 2 (two) times daily., Disp: 14 capsule, Rfl: 0  [2]   Social History  Socioeconomic History    Marital status:    Tobacco Use    Smoking status: Never     Passive exposure: Never    Smokeless tobacco: Never   Substance and Sexual Activity    Alcohol use: Yes     Comment: social     Social Drivers of Health     Financial Resource Strain: Low Risk  (2/12/2025)    Overall Financial Resource Strain (CARDIA)     Difficulty of Paying Living Expenses: Not hard at all   Food Insecurity: No Food Insecurity (2/12/2025)    Hunger Vital Sign     Worried About Running Out of Food in the Last Year: Never true     Ran Out of Food in the Last Year: Never true   Transportation Needs: No Transportation Needs (2/12/2025)    PRAPARE - Transportation     Lack of Transportation (Medical): No     Lack of Transportation (Non-Medical): No   Physical Activity: Insufficiently Active (2/12/2025)    Exercise Vital Sign     Days of Exercise per Week: 2 days     Minutes of Exercise per Session: 10 min   Stress: No Stress Concern Present (2/12/2025)    [employer] - Occupational Stress Questionnaire     Feeling of Stress : Not at all   Housing Stability: Low Risk  (2/12/2025)    Housing Stability Vital Sign     Unable to Pay for Housing in the Last Year: No     Number of Times Moved in the Last Year: 0     Homeless in the Last Year: No

## 2025-02-21 ENCOUNTER — CLINICAL SUPPORT (OUTPATIENT)
Dept: REHABILITATION | Facility: HOSPITAL | Age: 65
End: 2025-02-21
Attending: INTERNAL MEDICINE
Payer: COMMERCIAL

## 2025-02-21 DIAGNOSIS — M25.50 POLYARTHRALGIA: ICD-10-CM

## 2025-02-21 DIAGNOSIS — M06.9 RHEUMATOID ARTHRITIS INVOLVING MULTIPLE JOINTS: ICD-10-CM

## 2025-02-21 PROCEDURE — 97810 ACUP 1/> WO ESTIM 1ST 15 MIN: CPT | Mod: PO

## 2025-02-21 PROCEDURE — 97811 ACUP 1/> W/O ESTIM EA ADD 15: CPT | Mod: PO

## 2025-02-21 NOTE — PROGRESS NOTES
Acupuncture Evaluation Note     Name: Shira Gloria  Clinic Number: [id number]    Traditional Chinese Medicine (TCM) Diagnosis: Qi Stagnation  Medical Diagnosis:   Encounter Diagnoses   Name Primary?    Rheumatoid arthritis involving multiple joints     Polyarthralgia         Evaluation Date: 2/21/25    Visit #/Visits authorized: 9/12    Precautions: Standard    Subjective     Chief Concern: Polyarthralgia, chronic pain, various joints in chronic pain including shoulders/joints of neck/knees, etc.    Medical necessity is demonstrated by the following IMPAIRMENTS: Medical Necessity: Decreased mobility limits day to day activities, social, and emergent situations              Aggravating Factors:  movement and pressure   Relieving Factors:  rest    Symptom Description:     Quality:  Aching  Severity:  5  Frequency:  when active      Objective     Pulse:        wiry       Treatment       Acupuncture points used: K3, UB 62, SI4/3, TB 5, du20  Needles In: 10  Needles Out: 10  [place] W/O STIM placed:2pm  [place] W/O STIM removed: 2:30pm      Assessment     After treatment, patient felt more relaxed and less strain    Patient prognosis is Good.     Patient will continue to benefit from acupuncture treatment to address the deficits listed in the problem list box on initial evaluation, provide patient family education and to maximize pt's level of independence in the home and community environment.     Patient's spiritual, cultural and educational needs considered and pt agreeable to plan of care and goals.     Anticipated barriers to treatment: none    Plan     Recommend every couple weeks for 12 sessions with midway re-assess.      Education:  Patient is aware of cumulative benefit of acupuncture

## 2025-02-28 ENCOUNTER — LAB VISIT (OUTPATIENT)
Dept: LAB | Facility: HOSPITAL | Age: 65
End: 2025-02-28
Attending: INTERNAL MEDICINE
Payer: COMMERCIAL

## 2025-02-28 DIAGNOSIS — I10 HYPERTENSION, ESSENTIAL: ICD-10-CM

## 2025-02-28 DIAGNOSIS — M06.9 RHEUMATOID ARTHRITIS INVOLVING MULTIPLE JOINTS: ICD-10-CM

## 2025-02-28 DIAGNOSIS — E03.4 HYPOTHYROIDISM DUE TO ACQUIRED ATROPHY OF THYROID: ICD-10-CM

## 2025-02-28 LAB
ALBUMIN SERPL BCP-MCNC: 3.4 G/DL (ref 3.5–5.2)
ALP SERPL-CCNC: 156 U/L (ref 40–150)
ALT SERPL W/O P-5'-P-CCNC: 24 U/L (ref 10–44)
ANION GAP SERPL CALC-SCNC: 7 MMOL/L (ref 8–16)
AST SERPL-CCNC: 34 U/L (ref 10–40)
BASOPHILS # BLD AUTO: 0.1 K/UL (ref 0–0.2)
BASOPHILS NFR BLD: 1.3 % (ref 0–1.9)
BILIRUB SERPL-MCNC: 0.9 MG/DL (ref 0.1–1)
BUN SERPL-MCNC: 11 MG/DL (ref 8–23)
CALCIUM SERPL-MCNC: 8.9 MG/DL (ref 8.7–10.5)
CHLORIDE SERPL-SCNC: 110 MMOL/L (ref 95–110)
CHOLEST SERPL-MCNC: 200 MG/DL (ref 120–199)
CHOLEST/HDLC SERPL: 4.4 {RATIO} (ref 2–5)
CO2 SERPL-SCNC: 22 MMOL/L (ref 23–29)
CREAT SERPL-MCNC: 0.7 MG/DL (ref 0.5–1.4)
CRP SERPL-MCNC: 8.8 MG/L (ref 0–8.2)
DIFFERENTIAL METHOD BLD: NORMAL
EOSINOPHIL # BLD AUTO: 0.4 K/UL (ref 0–0.5)
EOSINOPHIL NFR BLD: 5.5 % (ref 0–8)
ERYTHROCYTE [DISTWIDTH] IN BLOOD BY AUTOMATED COUNT: 13.7 % (ref 11.5–14.5)
ERYTHROCYTE [SEDIMENTATION RATE] IN BLOOD BY PHOTOMETRIC METHOD: 30 MM/HR (ref 0–36)
EST. GFR  (NO RACE VARIABLE): >60 ML/MIN/1.73 M^2
GLUCOSE SERPL-MCNC: 91 MG/DL (ref 70–110)
HCT VFR BLD AUTO: 43.2 % (ref 37–48.5)
HDLC SERPL-MCNC: 45 MG/DL (ref 40–75)
HDLC SERPL: 22.5 % (ref 20–50)
HGB BLD-MCNC: 14.1 G/DL (ref 12–16)
IMM GRANULOCYTES # BLD AUTO: 0.02 K/UL (ref 0–0.04)
IMM GRANULOCYTES NFR BLD AUTO: 0.3 % (ref 0–0.5)
LDLC SERPL CALC-MCNC: 131.8 MG/DL (ref 63–159)
LYMPHOCYTES # BLD AUTO: 1.6 K/UL (ref 1–4.8)
LYMPHOCYTES NFR BLD: 21.1 % (ref 18–48)
MCH RBC QN AUTO: 30.8 PG (ref 27–31)
MCHC RBC AUTO-ENTMCNC: 32.6 G/DL (ref 32–36)
MCV RBC AUTO: 94 FL (ref 82–98)
MONOCYTES # BLD AUTO: 0.7 K/UL (ref 0.3–1)
MONOCYTES NFR BLD: 9 % (ref 4–15)
NEUTROPHILS # BLD AUTO: 4.8 K/UL (ref 1.8–7.7)
NEUTROPHILS NFR BLD: 62.8 % (ref 38–73)
NONHDLC SERPL-MCNC: 155 MG/DL
NRBC BLD-RTO: 0 /100 WBC
PLATELET # BLD AUTO: 337 K/UL (ref 150–450)
PMV BLD AUTO: 10.4 FL (ref 9.2–12.9)
POTASSIUM SERPL-SCNC: 3.8 MMOL/L (ref 3.5–5.1)
PROT SERPL-MCNC: 6.7 G/DL (ref 6–8.4)
RBC # BLD AUTO: 4.58 M/UL (ref 4–5.4)
SODIUM SERPL-SCNC: 139 MMOL/L (ref 136–145)
TRIGL SERPL-MCNC: 116 MG/DL (ref 30–150)
TSH SERPL DL<=0.005 MIU/L-ACNC: 0.73 UIU/ML (ref 0.4–4)
WBC # BLD AUTO: 7.66 K/UL (ref 3.9–12.7)

## 2025-02-28 PROCEDURE — 80061 LIPID PANEL: CPT | Performed by: NURSE PRACTITIONER

## 2025-02-28 PROCEDURE — 80053 COMPREHEN METABOLIC PANEL: CPT | Performed by: INTERNAL MEDICINE

## 2025-02-28 PROCEDURE — 36415 COLL VENOUS BLD VENIPUNCTURE: CPT | Mod: PO | Performed by: INTERNAL MEDICINE

## 2025-02-28 PROCEDURE — 86140 C-REACTIVE PROTEIN: CPT | Performed by: INTERNAL MEDICINE

## 2025-02-28 PROCEDURE — 85025 COMPLETE CBC W/AUTO DIFF WBC: CPT | Performed by: INTERNAL MEDICINE

## 2025-02-28 PROCEDURE — 85652 RBC SED RATE AUTOMATED: CPT | Performed by: INTERNAL MEDICINE

## 2025-02-28 PROCEDURE — 85651 RBC SED RATE NONAUTOMATED: CPT | Mod: PO | Performed by: INTERNAL MEDICINE

## 2025-02-28 PROCEDURE — 84443 ASSAY THYROID STIM HORMONE: CPT | Performed by: NURSE PRACTITIONER

## 2025-03-01 ENCOUNTER — RESULTS FOLLOW-UP (OUTPATIENT)
Dept: RHEUMATOLOGY | Facility: CLINIC | Age: 65
End: 2025-03-01

## 2025-03-03 ENCOUNTER — RESULTS FOLLOW-UP (OUTPATIENT)
Dept: FAMILY MEDICINE | Facility: CLINIC | Age: 65
End: 2025-03-03

## 2025-03-10 RX ORDER — PREDNISONE 20 MG/1
20 TABLET ORAL DAILY
Qty: 10 TABLET | Refills: 0 | Status: SHIPPED | OUTPATIENT
Start: 2025-03-10

## 2025-04-04 ENCOUNTER — CLINICAL SUPPORT (OUTPATIENT)
Dept: REHABILITATION | Facility: HOSPITAL | Age: 65
End: 2025-04-04
Attending: INTERNAL MEDICINE
Payer: COMMERCIAL

## 2025-04-04 ENCOUNTER — OFFICE VISIT (OUTPATIENT)
Dept: RHEUMATOLOGY | Facility: CLINIC | Age: 65
End: 2025-04-04
Payer: COMMERCIAL

## 2025-04-04 DIAGNOSIS — M06.9 RHEUMATOID ARTHRITIS FLARE: Primary | ICD-10-CM

## 2025-04-04 DIAGNOSIS — D84.821 IMMUNODEFICIENCY DUE TO DRUG THERAPY: ICD-10-CM

## 2025-04-04 DIAGNOSIS — M06.9 RHEUMATOID ARTHRITIS INVOLVING MULTIPLE JOINTS: Primary | ICD-10-CM

## 2025-04-04 DIAGNOSIS — Z79.899 IMMUNODEFICIENCY DUE TO DRUG THERAPY: ICD-10-CM

## 2025-04-04 PROCEDURE — 97811 ACUP 1/> W/O ESTIM EA ADD 15: CPT | Mod: PO

## 2025-04-04 PROCEDURE — 98007 SYNCH AUDIO-VIDEO EST HI 40: CPT | Mod: 95,,, | Performed by: INTERNAL MEDICINE

## 2025-04-04 PROCEDURE — 97810 ACUP 1/> WO ESTIM 1ST 15 MIN: CPT | Mod: PO

## 2025-04-04 NOTE — PROGRESS NOTES
Subjective:       Patient ID: Shira Gloria is a 61 y.o. female.    Chief Complaint: Disease Management    HPI  61 year old F with PMH of hypothyroidism, HTN, JAYDEN, nephrolithiasis here for evaluation.She has been having pain for 18 months.  She has pain in shoulders, wrists, hands, and hips.   Pain level can be as high as 8/10. She takes advil 2 pills daily.  She reports ice is most helpful. She used to take meloxicam and it did not help her pain. Tramadol does not help her pain.  Denies any hair loss, oral ulcers, fevers, or raynauds.    Denies taking steroids.  Family hx: negative for RA  Grandmother:SLE     Interval history: She is on MTX 8 pills once a week. She has increasing pain and swelling in hands.  She had recent steroid injection into right hand with improvement.   She is stiff for 30 minutes.      Review of Systems   Constitutional: Negative for activity change, appetite change, chills, diaphoresis, fatigue and fever.   HENT: Negative for congestion, ear discharge, ear pain, facial swelling, mouth sores, sinus pressure, sneezing, sore throat, tinnitus and trouble swallowing.    Eyes: Negative for photophobia, pain, discharge, redness, itching and visual disturbance.   Respiratory: Negative for apnea, cough, chest tightness, shortness of breath, wheezing and stridor.    Cardiovascular: Negative for chest pain and leg swelling.   Gastrointestinal: Negative for abdominal distention, abdominal pain, anal bleeding, blood in stool, constipation, diarrhea and nausea.   Endocrine: Negative for cold intolerance and heat intolerance.   Genitourinary: Negative for difficulty urinating, dysuria and genital sores.   Musculoskeletal: Positive for arthralgias and joint swelling. Negative for back pain, gait problem, myalgias, neck pain and neck stiffness.   Skin: Negative for color change, pallor, rash and wound.   Neurological: Negative for dizziness, seizures, light-headedness, numbness and headaches.    Hematological: Negative for adenopathy. Does not bruise/bleed easily.   Psychiatric/Behavioral: Negative for sleep disturbance. The patient is not nervous/anxious.            Objective:     Physical Exam   Constitutional: She is oriented to person, place, and time.   HENT:   Head: Normocephalic and atraumatic.   Right Ear: External ear normal.   Left Ear: External ear normal.   Nose: Nose normal.   Mouth/Throat: Oropharynx is clear and moist. No oropharyngeal exudate.   Eyes: Pupils are equal, round, and reactive to light. Conjunctivae are normal. Right eye exhibits no discharge. Left eye exhibits no discharge. No scleral icterus.   Neck: No JVD present. No thyromegaly present.   Cardiovascular: Normal rate, regular rhythm and normal heart sounds. Exam reveals no gallop and no friction rub.   No murmur heard.  Pulmonary/Chest: Effort normal and breath sounds normal. No respiratory distress. She has no wheezes. She has no rales. She exhibits no tenderness.   Abdominal: Soft. Bowel sounds are normal. She exhibits no distension and no mass. There is no abdominal tenderness. There is no rebound and no guarding.   Musculoskeletal:         General: No swelling, tenderness, deformity or signs of injury.      Cervical back: Neck supple.   Lymphadenopathy:     She has no cervical adenopathy.   Neurological: She is alert and oriented to person, place, and time. No cranial nerve deficit. Gait normal. Coordination normal.   Skin: Skin is dry. No rash noted. No erythema. No pallor.   Psychiatric: Affect and judgment normal.        No data to display     Assessment:      63 year old F with PMH of hypothyroidism, HTN, JAYDEN, nephrolithiasis here for follow up of seropositive RA:    # Seropositive RA:She has +RF and +CCP and reports bilateral symmetrical joint pain which is consistent with seropositive RA.  She is flaring.  Continue MTX 20mg once a week  Continue folic acid 1 mg po qday  Labs every 12 weeks  Start prednisone 20mg   a day for 10 days  Read about Humira        Immunodeficiency due to drug-   -monitor carefully for infection and any toxicities associated with immunosuppressants  -advised age appropriate cancer screenings including yearly skin exam and age appropriate vaccinations  -advised to seek immediate care in the setting of infection and hold immunosuppressive medications if there is infection      45 * minutes of total time spent on the encounter, which includes face to face time and non-face to face time preparing to see the patient (eg, review of tests), Obtaining and/or reviewing separately obtained history, Documenting clinical information in the electronic or other health record, Independently interpreting results (not separately reported) and communicating results to the patient/family/caregiver, or Care coordination (not separately reported).

## 2025-04-05 NOTE — PROGRESS NOTES
Acupuncture Evaluation Note     Name: Shira Gloria  Clinic Number: [id number]    Traditional Chinese Medicine (TCM) Diagnosis: Qi Stagnation  Medical Diagnosis:   Encounter Diagnosis   Name Primary?    Rheumatoid arthritis involving multiple joints Yes        Evaluation Date: 4/4/25    Visit #/Visits authorized: 10/12    Precautions: Standard    Subjective     Chief Concern: Polyarthralgia, chronic pain, various joints in chronic pain including shoulders/joints of neck/knees, etc.    Medical necessity is demonstrated by the following IMPAIRMENTS: Medical Necessity: Decreased mobility limits day to day activities, social, and emergent situations              Aggravating Factors:  movement and pressure   Relieving Factors:  rest    Symptom Description:     Quality:  Aching  Severity:  5  Frequency:  when active      Objective     Pulse:        wiry       Treatment       Acupuncture points used: K3, UB 62, SI4/3, TB 5, du20  Needles In: 10  Needles Out: 10  [place] W/O STIM placed:10am  [place] W/O STIM removed: 10:30am      Assessment     After treatment, patient felt more relaxed and less strain    Patient prognosis is Good.     Patient will continue to benefit from acupuncture treatment to address the deficits listed in the problem list box on initial evaluation, provide patient family education and to maximize pt's level of independence in the home and community environment.     Patient's spiritual, cultural and educational needs considered and pt agreeable to plan of care and goals.     Anticipated barriers to treatment: none    Plan     Recommend every couple weeks for 12 sessions with midway re-assess.      Education:  Patient is aware of cumulative benefit of acupuncture

## 2025-05-16 ENCOUNTER — CLINICAL SUPPORT (OUTPATIENT)
Dept: REHABILITATION | Facility: HOSPITAL | Age: 65
End: 2025-05-16
Attending: INTERNAL MEDICINE
Payer: COMMERCIAL

## 2025-05-16 DIAGNOSIS — M06.9 RHEUMATOID ARTHRITIS FLARE: ICD-10-CM

## 2025-05-16 DIAGNOSIS — M06.9 RHEUMATOID ARTHRITIS INVOLVING MULTIPLE JOINTS: Primary | ICD-10-CM

## 2025-05-16 PROCEDURE — 97811 ACUP 1/> W/O ESTIM EA ADD 15: CPT | Mod: PO

## 2025-05-16 PROCEDURE — 97810 ACUP 1/> WO ESTIM 1ST 15 MIN: CPT | Mod: PO

## 2025-05-16 RX ORDER — METHOTREXATE 2.5 MG/1
20 TABLET ORAL
Qty: 96 TABLET | Refills: 0 | Status: SHIPPED | OUTPATIENT
Start: 2025-05-16 | End: 2026-05-16

## 2025-05-16 RX ORDER — METHOTREXATE 2.5 MG/1
20 TABLET ORAL
Qty: 96 TABLET | Refills: 0 | Status: SHIPPED | OUTPATIENT
Start: 2025-05-16 | End: 2025-05-16 | Stop reason: SDUPTHER

## 2025-05-16 NOTE — TELEPHONE ENCOUNTER
Refill sent to provider.  ----- Message from [place] sent at 5/16/2025  9:29 AM CDT -----  Contact: Selma @ [phone]  Rx Refill/RequestIs this a Refill or New Rx:refillRx Name and Strength:methotrexate 2.5 MG TabPreferred Pharmacy with phone number: [id number] [place] [address] [address] [place] [address] [place] [id number]: [phone] Fax: [phone] Preference:[phone] Information: Selma senior [facility] is requesting refill

## 2025-05-27 NOTE — PROGRESS NOTES
Acupuncture Evaluation Note     Name: Shira Gloria  Clinic Number: [id number]    Traditional Chinese Medicine (TCM) Diagnosis: Qi Stagnation  Medical Diagnosis:   Encounter Diagnosis   Name Primary?    Rheumatoid arthritis involving multiple joints Yes        Evaluation Date: 5/16/25    Visit #/Visits authorized: 11/12    Precautions: Standard    Subjective     Chief Concern: Polyarthralgia, chronic pain, various joints in chronic pain including shoulders/joints of neck/knees, etc.    Medical necessity is demonstrated by the following IMPAIRMENTS: Medical Necessity: Decreased mobility limits day to day activities, social, and emergent situations              Aggravating Factors:  movement and pressure   Relieving Factors:  rest    Symptom Description:     Quality:  Aching  Severity:  5  Frequency:  when active      Objective     Pulse:        wiry       Treatment       Acupuncture points used: K3, UB 62, SI4/3, TB 5, du20  Needles In: 10  Needles Out: 10  [place] W/O STIM placed:10am  [place] W/O STIM removed: 10:30am      Assessment     After treatment, patient felt more relaxed and less strain    Patient prognosis is Good.     Patient will continue to benefit from acupuncture treatment to address the deficits listed in the problem list box on initial evaluation, provide patient family education and to maximize pt's level of independence in the home and community environment.     Patient's spiritual, cultural and educational needs considered and pt agreeable to plan of care and goals.     Anticipated barriers to treatment: none    Plan     Recommend every couple weeks for 12 sessions with midway re-assess.      Education:  Patient is aware of cumulative benefit of acupuncture

## 2025-06-11 ENCOUNTER — PATIENT MESSAGE (OUTPATIENT)
Dept: REHABILITATION | Facility: HOSPITAL | Age: 65
End: 2025-06-11
Payer: COMMERCIAL

## 2025-07-03 ENCOUNTER — LAB VISIT (OUTPATIENT)
Dept: LAB | Facility: HOSPITAL | Age: 65
End: 2025-07-03
Attending: INTERNAL MEDICINE
Payer: COMMERCIAL

## 2025-07-03 ENCOUNTER — RESULTS FOLLOW-UP (OUTPATIENT)
Dept: RHEUMATOLOGY | Facility: CLINIC | Age: 65
End: 2025-07-03

## 2025-07-03 ENCOUNTER — CLINICAL SUPPORT (OUTPATIENT)
Dept: REHABILITATION | Facility: HOSPITAL | Age: 65
End: 2025-07-03
Attending: INTERNAL MEDICINE
Payer: COMMERCIAL

## 2025-07-03 DIAGNOSIS — M06.9 RHEUMATOID ARTHRITIS INVOLVING MULTIPLE JOINTS: Primary | ICD-10-CM

## 2025-07-03 DIAGNOSIS — M06.9 RHEUMATOID ARTHRITIS INVOLVING MULTIPLE JOINTS: ICD-10-CM

## 2025-07-03 LAB
ABSOLUTE EOSINOPHIL (OHS): 0.49 K/UL
ABSOLUTE MONOCYTE (OHS): 0.76 K/UL (ref 0.3–1)
ABSOLUTE NEUTROPHIL COUNT (OHS): 4.85 K/UL (ref 1.8–7.7)
ALBUMIN SERPL BCP-MCNC: 3.6 G/DL (ref 3.5–5.2)
ALP SERPL-CCNC: 162 UNIT/L (ref 40–150)
ALT SERPL W/O P-5'-P-CCNC: 26 UNIT/L (ref 10–44)
ANION GAP (OHS): 7 MMOL/L (ref 8–16)
AST SERPL-CCNC: 29 UNIT/L (ref 11–45)
BASOPHILS # BLD AUTO: 0.09 K/UL
BASOPHILS NFR BLD AUTO: 1.1 %
BILIRUB SERPL-MCNC: 0.9 MG/DL (ref 0.1–1)
BUN SERPL-MCNC: 14 MG/DL (ref 8–23)
CALCIUM SERPL-MCNC: 9 MG/DL (ref 8.7–10.5)
CHLORIDE SERPL-SCNC: 110 MMOL/L (ref 95–110)
CO2 SERPL-SCNC: 25 MMOL/L (ref 23–29)
CREAT SERPL-MCNC: 0.8 MG/DL (ref 0.5–1.4)
CRP SERPL-MCNC: 7.2 MG/L
ERYTHROCYTE [DISTWIDTH] IN BLOOD BY AUTOMATED COUNT: 13.8 % (ref 11.5–14.5)
ERYTHROCYTE [SEDIMENTATION RATE] IN BLOOD BY PHOTOMETRIC METHOD: 15 MM/HR
GFR SERPLBLD CREATININE-BSD FMLA CKD-EPI: >60 ML/MIN/1.73/M2
GLUCOSE SERPL-MCNC: 97 MG/DL (ref 70–110)
HCT VFR BLD AUTO: 40.3 % (ref 37–48.5)
HGB BLD-MCNC: 13.2 GM/DL (ref 12–16)
IMM GRANULOCYTES # BLD AUTO: 0.07 K/UL (ref 0–0.04)
IMM GRANULOCYTES NFR BLD AUTO: 0.8 % (ref 0–0.5)
LYMPHOCYTES # BLD AUTO: 2.03 K/UL (ref 1–4.8)
MCH RBC QN AUTO: 31 PG (ref 27–31)
MCHC RBC AUTO-ENTMCNC: 32.8 G/DL (ref 32–36)
MCV RBC AUTO: 95 FL (ref 82–98)
NUCLEATED RBC (/100WBC) (OHS): 0 /100 WBC
PLATELET # BLD AUTO: 303 K/UL (ref 150–450)
PMV BLD AUTO: 10.9 FL (ref 9.2–12.9)
POTASSIUM SERPL-SCNC: 3.8 MMOL/L (ref 3.5–5.1)
PROT SERPL-MCNC: 6.7 GM/DL (ref 6–8.4)
RBC # BLD AUTO: 4.26 M/UL (ref 4–5.4)
RELATIVE EOSINOPHIL (OHS): 5.9 %
RELATIVE LYMPHOCYTE (OHS): 24.5 % (ref 18–48)
RELATIVE MONOCYTE (OHS): 9.2 % (ref 4–15)
RELATIVE NEUTROPHIL (OHS): 58.5 % (ref 38–73)
SODIUM SERPL-SCNC: 142 MMOL/L (ref 136–145)
WBC # BLD AUTO: 8.29 K/UL (ref 3.9–12.7)

## 2025-07-03 PROCEDURE — 36415 COLL VENOUS BLD VENIPUNCTURE: CPT | Mod: PO

## 2025-07-03 PROCEDURE — 85025 COMPLETE CBC W/AUTO DIFF WBC: CPT

## 2025-07-03 PROCEDURE — 97811 ACUP 1/> W/O ESTIM EA ADD 15: CPT | Mod: PO

## 2025-07-03 PROCEDURE — 97810 ACUP 1/> WO ESTIM 1ST 15 MIN: CPT | Mod: PO

## 2025-07-03 PROCEDURE — 80053 COMPREHEN METABOLIC PANEL: CPT

## 2025-07-03 PROCEDURE — 85652 RBC SED RATE AUTOMATED: CPT

## 2025-07-03 PROCEDURE — 86140 C-REACTIVE PROTEIN: CPT

## 2025-07-05 NOTE — PROGRESS NOTES
Acupuncture Evaluation Note     Name: Shira Gloria  Clinic Number: [id number]    Traditional Chinese Medicine (TCM) Diagnosis: Qi Stagnation  Medical Diagnosis:   Encounter Diagnosis   Name Primary?    Rheumatoid arthritis involving multiple joints Yes        Evaluation Date: 7/3/25    Visit #/Visits authorized: 12/12    Precautions: Standard    Subjective     Chief Concern: Polyarthralgia, chronic pain, various joints in chronic pain including shoulders/joints of neck/knees, etc.    Medical necessity is demonstrated by the following IMPAIRMENTS: Medical Necessity: Decreased mobility limits day to day activities, social, and emergent situations              Aggravating Factors:  movement and pressure   Relieving Factors:  rest    Symptom Description:     Quality:  Aching  Severity:  5  Frequency:  when active      Objective     Pulse:        wiry       Treatment       Acupuncture points used: K3, UB 62, SI4/3, TB 5, du20  Needles In: 10  Needles Out: 10  Lavon W/O STIM placed:2:30pm  Lavon W/O STIM removed: 3pm      Assessment     After treatment, patient felt more relaxed and less strain    Patient prognosis is Good.     Patient will continue to benefit from acupuncture treatment to address the deficits listed in the problem list box on initial evaluation, provide patient family education and to maximize pt's level of independence in the home and community environment.     Patient's spiritual, cultural and educational needs considered and pt agreeable to plan of care and goals.     Anticipated barriers to treatment: none    Plan     Recommend every couple weeks for 12 sessions with midway re-assess.      Education:  Patient is aware of cumulative benefit of acupuncture

## 2025-07-18 DIAGNOSIS — M06.9 RHEUMATOID ARTHRITIS FLARE: ICD-10-CM

## 2025-07-21 RX ORDER — FOLIC ACID 1 MG/1
1 TABLET ORAL DAILY
Qty: 30 TABLET | Refills: 11 | Status: SHIPPED | OUTPATIENT
Start: 2025-07-21 | End: 2026-07-21

## 2025-08-12 ENCOUNTER — PATIENT MESSAGE (OUTPATIENT)
Dept: RHEUMATOLOGY | Facility: CLINIC | Age: 65
End: 2025-08-12
Payer: COMMERCIAL

## 2025-08-12 ENCOUNTER — OFFICE VISIT (OUTPATIENT)
Dept: FAMILY MEDICINE | Facility: CLINIC | Age: 65
End: 2025-08-12
Payer: COMMERCIAL

## 2025-08-12 VITALS
BODY MASS INDEX: 39.44 KG/M2 | TEMPERATURE: 98 F | WEIGHT: 208.88 LBS | HEIGHT: 61 IN | SYSTOLIC BLOOD PRESSURE: 116 MMHG | OXYGEN SATURATION: 97 % | DIASTOLIC BLOOD PRESSURE: 78 MMHG | RESPIRATION RATE: 18 BRPM | HEART RATE: 64 BPM

## 2025-08-12 DIAGNOSIS — Z23 IMMUNIZATION DUE: ICD-10-CM

## 2025-08-12 DIAGNOSIS — M05.741 RHEUMATOID ARTHRITIS INVOLVING BOTH HANDS WITH POSITIVE RHEUMATOID FACTOR: ICD-10-CM

## 2025-08-12 DIAGNOSIS — Z79.899 DRUG-INDUCED IMMUNODEFICIENCY: ICD-10-CM

## 2025-08-12 DIAGNOSIS — M05.742 RHEUMATOID ARTHRITIS INVOLVING BOTH HANDS WITH POSITIVE RHEUMATOID FACTOR: ICD-10-CM

## 2025-08-12 DIAGNOSIS — G47.33 OSA ON CPAP: ICD-10-CM

## 2025-08-12 DIAGNOSIS — D84.821 DRUG-INDUCED IMMUNODEFICIENCY: ICD-10-CM

## 2025-08-12 DIAGNOSIS — Z00.00 ANNUAL PHYSICAL EXAM: Primary | ICD-10-CM

## 2025-08-12 DIAGNOSIS — Z85.89 HISTORY OF SQUAMOUS CELL CARCINOMA: ICD-10-CM

## 2025-08-12 DIAGNOSIS — M06.9 RHEUMATOID ARTHRITIS FLARE: ICD-10-CM

## 2025-08-12 DIAGNOSIS — E03.4 HYPOTHYROIDISM DUE TO ACQUIRED ATROPHY OF THYROID: ICD-10-CM

## 2025-08-12 DIAGNOSIS — I10 HYPERTENSION, ESSENTIAL: ICD-10-CM

## 2025-08-12 DIAGNOSIS — R74.8 ELEVATED ALKALINE PHOSPHATASE LEVEL: ICD-10-CM

## 2025-08-12 PROCEDURE — 90677 PCV20 VACCINE IM: CPT | Mod: S$GLB,,, | Performed by: NURSE PRACTITIONER

## 2025-08-12 PROCEDURE — 90471 IMMUNIZATION ADMIN: CPT | Mod: S$GLB,,, | Performed by: NURSE PRACTITIONER

## 2025-08-12 PROCEDURE — 1159F MED LIST DOCD IN RCRD: CPT | Mod: CPTII,S$GLB,, | Performed by: NURSE PRACTITIONER

## 2025-08-12 PROCEDURE — 3078F DIAST BP <80 MM HG: CPT | Mod: CPTII,S$GLB,, | Performed by: NURSE PRACTITIONER

## 2025-08-12 PROCEDURE — 1160F RVW MEDS BY RX/DR IN RCRD: CPT | Mod: CPTII,S$GLB,, | Performed by: NURSE PRACTITIONER

## 2025-08-12 PROCEDURE — 99396 PREV VISIT EST AGE 40-64: CPT | Mod: 25,S$GLB,, | Performed by: NURSE PRACTITIONER

## 2025-08-12 PROCEDURE — 3074F SYST BP LT 130 MM HG: CPT | Mod: CPTII,S$GLB,, | Performed by: NURSE PRACTITIONER

## 2025-08-12 PROCEDURE — 3008F BODY MASS INDEX DOCD: CPT | Mod: CPTII,S$GLB,, | Performed by: NURSE PRACTITIONER

## 2025-08-12 RX ORDER — LEVOTHYROXINE SODIUM 88 UG/1
88 TABLET ORAL
Qty: 90 TABLET | Refills: 2 | Status: SHIPPED | OUTPATIENT
Start: 2025-08-12

## 2025-08-12 RX ORDER — FOLIC ACID 1 MG/1
1 TABLET ORAL DAILY
Qty: 90 TABLET | Refills: 2 | Status: SHIPPED | OUTPATIENT
Start: 2025-08-12 | End: 2026-05-09

## 2025-08-12 RX ORDER — DILTIAZEM HYDROCHLORIDE 240 MG/1
240 CAPSULE, COATED, EXTENDED RELEASE ORAL DAILY
Qty: 90 CAPSULE | Refills: 2 | Status: SHIPPED | OUTPATIENT
Start: 2025-08-12

## 2025-08-24 ENCOUNTER — PATIENT MESSAGE (OUTPATIENT)
Dept: RHEUMATOLOGY | Facility: CLINIC | Age: 65
End: 2025-08-24
Payer: COMMERCIAL

## 2025-08-28 DIAGNOSIS — M06.9 RHEUMATOID ARTHRITIS FLARE: Primary | ICD-10-CM

## 2025-08-28 RX ORDER — METHOTREXATE 20 MG/.4ML
20 INJECTION, SOLUTION SUBCUTANEOUS
Qty: 1.6 ML | Refills: 4 | Status: ACTIVE | OUTPATIENT
Start: 2025-08-28

## 2025-08-28 RX ORDER — ADALIMUMAB 40MG/0.4ML
40 KIT SUBCUTANEOUS
Qty: 2 PEN | Refills: 11 | Status: ACTIVE | OUTPATIENT
Start: 2025-08-28 | End: 2025-08-29 | Stop reason: SDUPTHER

## 2025-08-29 DIAGNOSIS — M06.9 RHEUMATOID ARTHRITIS INVOLVING MULTIPLE JOINTS: Primary | ICD-10-CM

## 2025-08-29 RX ORDER — ADALIMUMAB-ADBM 40MG/0.4ML
40 KIT SUBCUTANEOUS
Qty: 2 PEN | Refills: 2 | Status: ACTIVE | OUTPATIENT
Start: 2025-08-29